# Patient Record
Sex: FEMALE | Race: WHITE | NOT HISPANIC OR LATINO | Employment: OTHER | RURAL
[De-identification: names, ages, dates, MRNs, and addresses within clinical notes are randomized per-mention and may not be internally consistent; named-entity substitution may affect disease eponyms.]

---

## 2017-10-12 DIAGNOSIS — I65.23 CAROTID STENOSIS, ASYMPTOMATIC, BILATERAL: Primary | ICD-10-CM

## 2017-11-08 ENCOUNTER — OFFICE VISIT (OUTPATIENT)
Dept: CARDIAC SURGERY | Facility: CLINIC | Age: 82
End: 2017-11-08

## 2017-11-08 VITALS
DIASTOLIC BLOOD PRESSURE: 87 MMHG | BODY MASS INDEX: 24.46 KG/M2 | WEIGHT: 147 LBS | SYSTOLIC BLOOD PRESSURE: 160 MMHG | HEART RATE: 75 BPM

## 2017-11-08 DIAGNOSIS — I65.21 STENOSIS OF RIGHT CAROTID ARTERY: Primary | ICD-10-CM

## 2017-11-08 PROCEDURE — 99212 OFFICE O/P EST SF 10 MIN: CPT | Performed by: THORACIC SURGERY (CARDIOTHORACIC VASCULAR SURGERY)

## 2017-11-08 NOTE — PROGRESS NOTES
11/08/2017  Patient Information  Latrice Everett                                                                                          [address] [facility]  [place] [address]   1934  'PCP/Referring Physician'  Juanjo Razo MD  [phone]  No ref. provider found    Chief Complaint   Patient presents with   • Follow-up     1 year f/u w/ carotid duplex       History of Present Illness:  Patient returns today for follow-up of her carotid disease.  Over the last 12 months she has done very well.  She denies any neurologic events.  She denies any dizziness or vertigo.  She denies TIA or CVA type symptoms.  She sees Dr. Razo on a regular basis.  She denies any other major health issues.  Patient Active Problem List   Diagnosis   • CAD (coronary artery disease)   • Dyslipidemia   • Carotid artery stenosis   • Carotid stenosis s/p right CEA 8/9/16   • Hypothyroidism on replacement     Past Medical History:   Diagnosis Date   • Arthritis    • Carotid artery stenosis    • Dizzy    • Dyslipidemia    • GERD (gastroesophageal reflux disease)    • [place] (hard of hearing)    • Hypertension    • Hypothyroidism    • Irregular heart beat    • Skin cancer     removed from face and hands    • Wears dentures     upper   • Wears glasses      Past Surgical History:   Procedure Laterality Date   • CAROTID ENDARTERECTOMY Right 8/9/2016    Procedure: RIGHT CAROTID ENDARTERECTOMY;  Surgeon: Darwin Chapin MD;  Location: [facility];  Service:    • CAROTID ENDARTERECTOMY Left 04/21/2006    History of Neurological Surgery   • CAROTID ENDARTERECTOMY Right 08/09/2016   • COLONOSCOPY      x4   • SKIN LESION EXCISION  07/06/2016    pre-cancerous lesion removed from face       Current Outpatient Prescriptions:   •  alendronate (FOSAMAX) 35 MG tablet, Take 70 mg by mouth every 7 days. On saturdays , Disp: , Rfl:   •  amLODIPine (NORVASC) 2.5 MG tablet, Take 2.5 mg by mouth daily., Disp: , Rfl:   •  aspirin  MG  tablet, Take 162 mg by mouth daily., Disp: , Rfl:   •  clopidogrel (PLAVIX) 75 MG tablet, Take 1 tablet by mouth daily., Disp: 30 tablet, Rfl: 2  •  gemfibrozil (LOPID) 600 MG tablet, Take 600 mg by mouth 2 (two) times a day., Disp: , Rfl:   •  levothyroxine (SYNTHROID, LEVOTHROID) 112 MCG tablet, Take 112 mcg by mouth daily., Disp: , Rfl:   •  lovastatin (MEVACOR) 40 MG tablet, Take 40 mg by mouth every night., Disp: , Rfl:   •  nitroglycerin (NITROSTAT) 0.4 MG SL tablet, Place 0.4 mg under the tongue every 5 (five) minutes as needed for chest pain. Take no more than 3 doses in 15 minutes., Disp: , Rfl:   No Known Allergies  Social History     Social History   • Marital status:      Spouse name: N/A   • Number of children: 4   • Years of education: N/A     Occupational History   • [employer] Retired     Social History Main Topics   • Smoking status: Never Smoker   • Smokeless tobacco: Never Used   • Alcohol use No   • Drug use: No   • Sexual activity: Defer     Other Topics Concern   • Not on file     Social History Narrative     Family History   Problem Relation Age of Onset   • Peripheral vascular disease Mother    • Heart failure Father      Review of Systems   Constitution: Negative for chills, fever, malaise/fatigue, night sweats and weight loss.   HENT: Negative for congestion, hearing loss, odynophagia and sore throat.    Eyes: Negative for blurred vision and double vision.   Cardiovascular: Negative for chest pain, dyspnea on exertion, leg swelling, orthopnea and palpitations.   Respiratory: Negative for cough, hemoptysis and shortness of breath.    Endocrine: Negative for cold intolerance, heat intolerance, polydipsia, polyphagia and polyuria.   Hematologic/Lymphatic: Does not bruise/bleed easily.   Skin: Negative for itching and rash.   Musculoskeletal: Negative for back pain, joint pain, joint swelling, muscle cramps, muscle weakness, myalgias and neck pain.   Gastrointestinal: Negative for  abdominal pain, constipation, diarrhea, hematemesis, hematochezia, melena, nausea and vomiting.   Genitourinary: Negative for dysuria, frequency and hematuria.   Neurological: Negative for dizziness, focal weakness, headaches, light-headedness, loss of balance, numbness, paresthesias and seizures.   Psychiatric/Behavioral: Negative for depression and suicidal ideas. The patient is not nervous/anxious.    All other systems reviewed and are negative.    Vitals:    11/08/17 0811   BP: 160/87   BP Location: Right arm   Patient Position: Sitting   Pulse: 75   Weight: 147 lb (66.7 kg)      Physical Exam   Neck: Normal range of motion. Neck supple. No JVD present. No tracheal deviation present. No thyromegaly present.   Cardiovascular: Normal rate and regular rhythm.  Exam reveals no gallop and no friction rub.    No murmur heard.  Pulmonary/Chest: No stridor. No respiratory distress. She has no wheezes. She has no rales. She exhibits no tenderness.   Abdominal: Soft. Bowel sounds are normal. There is no tenderness.   Lymphadenopathy:     She has no cervical adenopathy.       Labs/Imaging:  The patient's carotid duplex looks good with 0 29% bilateral stenosis.      Assessment/Plan:   Her incisions are all healing well.  She has no bruits.  She is following closely with Dr. Razo.  She has had no neurologic symptoms.  Overall I think that Ms. Everett appears to be doing excellent.  We will see her back in one year with carotid duplex.      Patient Active Problem List   Diagnosis   • CAD (coronary artery disease)   • Dyslipidemia   • Carotid artery stenosis   • Carotid stenosis s/p right CEA 8/9/16   • Hypothyroidism on replacement     Signed by: Darwin Chapin M.D.    11/8/2017    CC:  MD Hannah Zapata, , editing for Darwin Chapin M.D.    I, Darwin Chapin MD, have read and agree with the editing done by Hannah Erickson, .

## 2018-12-20 ENCOUNTER — TELEPHONE (OUTPATIENT)
Dept: CARDIAC SURGERY | Facility: CLINIC | Age: 83
End: 2018-12-20

## 2019-02-05 ENCOUNTER — TELEPHONE (OUTPATIENT)
Dept: CARDIAC SURGERY | Facility: CLINIC | Age: 84
End: 2019-02-05

## 2019-02-05 DIAGNOSIS — I65.21 CAROTID STENOSIS, RIGHT: Primary | ICD-10-CM

## 2019-02-05 NOTE — TELEPHONE ENCOUNTER
Pt would like to come back for her f/u with Dr. Chapin at the [facility]-Pt would also like to have her test done at [facility]. Verified pt's address, phone number and insurance.

## 2019-03-13 ENCOUNTER — OFFICE VISIT (OUTPATIENT)
Dept: CARDIAC SURGERY | Facility: CLINIC | Age: 84
End: 2019-03-13

## 2019-03-13 VITALS
SYSTOLIC BLOOD PRESSURE: 157 MMHG | HEIGHT: 66 IN | HEART RATE: 61 BPM | BODY MASS INDEX: 23.63 KG/M2 | OXYGEN SATURATION: 98 % | DIASTOLIC BLOOD PRESSURE: 91 MMHG | WEIGHT: 147 LBS

## 2019-03-13 DIAGNOSIS — I65.23 BILATERAL CAROTID ARTERY STENOSIS: Primary | ICD-10-CM

## 2019-03-13 PROCEDURE — 99212 OFFICE O/P EST SF 10 MIN: CPT | Performed by: THORACIC SURGERY (CARDIOTHORACIC VASCULAR SURGERY)

## 2019-03-13 RX ORDER — ATENOLOL 25 MG/1
1 TABLET ORAL DAILY
COMMUNITY
Start: 2019-02-15

## 2019-03-13 RX ORDER — LOSARTAN POTASSIUM 100 MG/1
1 TABLET ORAL DAILY
COMMUNITY
Start: 2019-03-08 | End: 2020-09-27

## 2019-03-13 RX ORDER — ALENDRONATE SODIUM 70 MG/1
1 TABLET ORAL
COMMUNITY
Start: 2019-01-09

## 2020-04-09 ENCOUNTER — TELEPHONE (OUTPATIENT)
Dept: CARDIAC SURGERY | Facility: CLINIC | Age: 85
End: 2020-04-09

## 2020-05-22 DIAGNOSIS — I65.23 BILATERAL CAROTID ARTERY STENOSIS: Primary | ICD-10-CM

## 2020-08-26 ENCOUNTER — OFFICE VISIT (OUTPATIENT)
Dept: CARDIAC SURGERY | Facility: CLINIC | Age: 85
End: 2020-08-26

## 2020-08-26 VITALS
HEIGHT: 65 IN | HEART RATE: 57 BPM | TEMPERATURE: 97.5 F | OXYGEN SATURATION: 99 % | SYSTOLIC BLOOD PRESSURE: 170 MMHG | DIASTOLIC BLOOD PRESSURE: 90 MMHG | WEIGHT: 144 LBS | BODY MASS INDEX: 23.99 KG/M2

## 2020-08-26 DIAGNOSIS — I65.23 CAROTID STENOSIS, ASYMPTOMATIC, BILATERAL: Primary | ICD-10-CM

## 2020-08-26 PROCEDURE — 99212 OFFICE O/P EST SF 10 MIN: CPT | Performed by: THORACIC SURGERY (CARDIOTHORACIC VASCULAR SURGERY)

## 2020-08-26 RX ORDER — VALSARTAN 160 MG/1
160 TABLET ORAL DAILY
COMMUNITY
Start: 2020-06-15

## 2020-08-26 NOTE — PROGRESS NOTES
08/26/2020  Patient Information  Latrice Everett                                                                                          [address] [facility] [address]   1934  'PCP/Referring Physician'  Juanjo Razo MD (Inactive)  None  No ref. provider found    Chief Complaint   Patient presents with   • Follow-up     1 year follow up with Carotid duplex   • Carotid Artery Disease       History of Present Illness:   The patient returns today for follow-up of carotid artery disease.  Over the last year she has overall been doing well.  She denies any TIA or CVA symptoms.  She is not a smoker.  She is seeing Dr. Tony in follow-up in the next week she says.      Patient Active Problem List   Diagnosis   • CAD (coronary artery disease)   • Dyslipidemia   • Carotid artery stenosis   • Carotid stenosis s/p right CEA 8/9/16   • Hypothyroidism on replacement   • Carotid stenosis, asymptomatic, bilateral     Past Medical History:   Diagnosis Date   • Arthritis    • Atrial fibrillation (CMS/HCC)    • Carotid artery stenosis    • Dizzy    • Dyslipidemia    • GERD (gastroesophageal reflux disease)    • [place] (hard of hearing)    • Hypertension    • Hypothyroidism    • Irregular heart beat    • Skin cancer     removed from face and hands    • Wears dentures     upper   • Wears glasses      Past Surgical History:   Procedure Laterality Date   • CAROTID ENDARTERECTOMY Right 8/9/2016    Procedure: RIGHT CAROTID ENDARTERECTOMY;  Surgeon: Darwin Chapin MD;  Location: [facility];  Service:    • CAROTID ENDARTERECTOMY Left 04/21/2006    History of Neurological Surgery   • CAROTID ENDARTERECTOMY Right 08/09/2016   • COLONOSCOPY      x4   • CORONARY STENT PLACEMENT     • SKIN LESION EXCISION  07/06/2016    pre-cancerous lesion removed from face       Current Outpatient Medications:   •  alendronate (FOSAMAX) 70 MG tablet, Take 1 tablet by mouth Daily., Disp: , Rfl:   •  amLODIPine (NORVASC) 5 MG  tablet, Take 5 mg by mouth Daily., Disp: , Rfl:   •  aspirin 81 MG EC tablet, Take 162 mg by mouth Daily., Disp: , Rfl:   •  atenolol (TENORMIN) 25 MG tablet, Take 1 tablet by mouth 2 (Two) Times a Day., Disp: , Rfl:   •  levothyroxine (SYNTHROID, LEVOTHROID) 125 MCG tablet, Take 125 mcg by mouth Daily., Disp: , Rfl:   •  lovastatin (MEVACOR) 10 MG tablet, Take 40 mg by mouth Every Night., Disp: , Rfl:   •  nitroglycerin (NITROSTAT) 0.4 MG SL tablet, Place 0.4 mg under the tongue every 5 (five) minutes as needed for chest pain. Take no more than 3 doses in 15 minutes., Disp: , Rfl:   •  rivaroxaban (XARELTO) 20 MG tablet, Take 20 mg by mouth Daily., Disp: , Rfl:   •  valsartan (DIOVAN) 160 MG tablet, , Disp: , Rfl:   •  alendronate (FOSAMAX) 35 MG tablet, Take 70 mg by mouth every 7 days. On saturdays , Disp: , Rfl:   •  clopidogrel (PLAVIX) 75 MG tablet, Take 1 tablet by mouth daily., Disp: 30 tablet, Rfl: 2  •  gemfibrozil (LOPID) 600 MG tablet, Take 600 mg by mouth 2 (two) times a day., Disp: , Rfl:   •  losartan (COZAAR) 100 MG tablet, Take 1 tablet by mouth Daily., Disp: , Rfl:   No Known Allergies  Social History     Socioeconomic History   • Marital status:      Spouse name: Not on file   • Number of children: 4   • Years of education: Not on file   • Highest education level: Not on file   Occupational History   • Occupation: [employer]     Employer: RETIRED   Tobacco Use   • Smoking status: Never Smoker   • Smokeless tobacco: Never Used   Substance and Sexual Activity   • Alcohol use: No   • Drug use: No   • Sexual activity: Defer   Social History Narrative    Lives in [place], [place] alone     Family History   Problem Relation Age of Onset   • Peripheral vascular disease Mother    • Heart failure Father      Review of Systems   Constitution: Positive for malaise/fatigue. Negative for chills, diaphoresis, fever and weight loss.   HENT: Positive for hearing loss (hearing aids). Negative for  "congestion, hoarse voice and sore throat.    Eyes: Negative for blurred vision and double vision.   Cardiovascular: Positive for chest pain and dyspnea on exertion. Negative for claudication, leg swelling, palpitations and syncope.   Respiratory: Positive for shortness of breath. Negative for cough, hemoptysis and wheezing.    Hematologic/Lymphatic: Bruises/bleeds easily.   Skin: Negative for itching, poor wound healing and rash.   Musculoskeletal: Positive for joint pain. Negative for arthritis, back pain, falls, joint swelling, muscle cramps, muscle weakness and neck pain.   Gastrointestinal: Negative for abdominal pain, constipation, diarrhea, hematemesis, nausea and vomiting.   Genitourinary: Negative for dysuria, frequency, hematuria, hesitancy, incomplete emptying and urgency.   Neurological: Positive for dizziness and loss of balance. Negative for headaches, light-headedness, numbness and vertigo.   Psychiatric/Behavioral: Positive for depression. Negative for memory loss and substance abuse. The patient is not nervous/anxious.    Allergic/Immunologic: Negative for environmental allergies and HIV exposure.     Vitals:    08/26/20 0747   BP: 170/90   Pulse: 57   Temp: 97.5 °F (36.4 °C)   TempSrc: Infrared   SpO2: 99%   Weight: 65.3 kg (144 lb)   Height: 165.1 cm (65\")      Physical Exam   Neck: Normal range of motion. Neck supple. No JVD present. No tracheal deviation present. No thyromegaly present.   Cardiovascular: Normal rate and regular rhythm. Exam reveals no gallop and no friction rub.   No murmur heard.  Pulses:       Carotid pulses are 0 on the right side, and 0 on the left side.  Pulmonary/Chest: No stridor. No respiratory distress. She has no wheezes. She has no rales. She exhibits no tenderness.   Abdominal: Soft. Bowel sounds are normal. There is no tenderness.   Lymphadenopathy:     She has no cervical adenopathy.       Assessment/Plan:  The patient is an 85-year-old female that returns today for " a follow-up of carotid artery stenosis. She overall appears to be doing satisfactory at this point.  I have obtained and reviewed her carotid duplex.  She has moderate disease on her left her right side looks satisfactory.  I encouraged her to continue close follow-up with Dr. Razo.  I will see her back in 1 year with a carotid duplex.    Patient Active Problem List   Diagnosis   • CAD (coronary artery disease)   • Dyslipidemia   • Carotid artery stenosis   • Carotid stenosis s/p right CEA 8/9/16   • Hypothyroidism on replacement   • Carotid stenosis, asymptomatic, bilateral     CC: MD Laurel Adhikari editing for Darwin Chapin MD    I, Darwin Chapin MD, have read and agree with the editing done by Laurel Abad, .

## 2020-09-02 DIAGNOSIS — Z00.6 EXAMINATION FOR NORMAL COMPARISON FOR CLINICAL RESEARCH: Primary | ICD-10-CM

## 2020-09-09 ENCOUNTER — OFFICE VISIT (OUTPATIENT)
Dept: CARDIAC SURGERY | Facility: CLINIC | Age: 85
End: 2020-09-09

## 2020-09-09 VITALS
OXYGEN SATURATION: 98 % | SYSTOLIC BLOOD PRESSURE: 160 MMHG | HEART RATE: 59 BPM | TEMPERATURE: 97.1 F | WEIGHT: 144 LBS | DIASTOLIC BLOOD PRESSURE: 70 MMHG | BODY MASS INDEX: 23.99 KG/M2 | HEIGHT: 65 IN

## 2020-09-09 DIAGNOSIS — I71.40 ABDOMINAL AORTIC ANEURYSM (AAA) WITHOUT RUPTURE (HCC): Primary | ICD-10-CM

## 2020-09-09 PROCEDURE — 99215 OFFICE O/P EST HI 40 MIN: CPT | Performed by: THORACIC SURGERY (CARDIOTHORACIC VASCULAR SURGERY)

## 2020-09-09 NOTE — PROGRESS NOTES
2020  Patient Information  Latrice Everett                                                                                          [address] [facility] [address]   1934  'PCP/Referring Physician'  Juanjo Razo MD  [phone]  No ref. provider found    Chief Complaint   Patient presents with   • Follow-up     To Discuss AAA SX-Increase in Size       History of Present Illness:  The patient is an 85-year-old white female who is being referred for a new problem.  She has been found to have an abdominal aneurysm.  She had a CT scan which reveals she has a 5 cm aneurysm.  Her son apparently had an aneurysm that was in his brain and almost .  She denies any back pain or abdominal pain at this current time.  She denies a family history of aneurysmal disease and she does not smoke at the current time.      Patient Active Problem List   Diagnosis   • CAD (coronary artery disease)   • Dyslipidemia   • Carotid artery stenosis   • Carotid stenosis s/p right CEA 16   • Hypothyroidism on replacement   • Carotid stenosis, asymptomatic, bilateral   • Abdominal aortic aneurysm (AAA) without rupture (CMS/HCC)     Past Medical History:   Diagnosis Date   • AAA (abdominal aortic aneurysm) (CMS/HCC)    • Arthritis    • Atrial fibrillation (CMS/HCC)    • Carotid artery stenosis    • Dizzy    • Dyslipidemia    • GERD (gastroesophageal reflux disease)    • [place] (hard of hearing)    • Hypertension    • Hypothyroidism    • Irregular heart beat    • Skin cancer     removed from face and hands    • Wears dentures     upper   • Wears glasses      Past Surgical History:   Procedure Laterality Date   • CAROTID ENDARTERECTOMY Right 2016    Procedure: RIGHT CAROTID ENDARTERECTOMY;  Surgeon: Darwin Chapin MD;  Location: [facility];  Service:    • CAROTID ENDARTERECTOMY Left 2006    History of Neurological Surgery   • CAROTID ENDARTERECTOMY Right 2016   • COLONOSCOPY      x4   •  CORONARY STENT PLACEMENT     • SKIN LESION EXCISION  07/06/2016    pre-cancerous lesion removed from face       Current Outpatient Medications:   •  alendronate (FOSAMAX) 70 MG tablet, Take 1 tablet by mouth Daily., Disp: , Rfl:   •  amLODIPine (NORVASC) 5 MG tablet, Take 5 mg by mouth Daily., Disp: , Rfl:   •  aspirin 81 MG EC tablet, Take 162 mg by mouth Daily., Disp: , Rfl:   •  atenolol (TENORMIN) 25 MG tablet, Take 1 tablet by mouth 2 (Two) Times a Day., Disp: , Rfl:   •  gemfibrozil (LOPID) 600 MG tablet, Take 600 mg by mouth 2 (two) times a day., Disp: , Rfl:   •  levothyroxine (SYNTHROID, LEVOTHROID) 125 MCG tablet, Take 125 mcg by mouth Daily., Disp: , Rfl:   •  losartan (COZAAR) 100 MG tablet, Take 1 tablet by mouth Daily., Disp: , Rfl:   •  lovastatin (MEVACOR) 10 MG tablet, Take 40 mg by mouth Every Night., Disp: , Rfl:   •  nitroglycerin (NITROSTAT) 0.4 MG SL tablet, Place 0.4 mg under the tongue every 5 (five) minutes as needed for chest pain. Take no more than 3 doses in 15 minutes., Disp: , Rfl:   •  rivaroxaban (XARELTO) 20 MG tablet, Take 20 mg by mouth Daily., Disp: , Rfl:   •  valsartan (DIOVAN) 160 MG tablet, , Disp: , Rfl:   •  alendronate (FOSAMAX) 35 MG tablet, Take 70 mg by mouth every 7 days. On saturdays , Disp: , Rfl:   •  clopidogrel (PLAVIX) 75 MG tablet, Take 1 tablet by mouth daily., Disp: 30 tablet, Rfl: 2  No Known Allergies  Social History     Socioeconomic History   • Marital status:      Spouse name: Not on file   • Number of children: 4   • Years of education: Not on file   • Highest education level: Not on file   Occupational History   • Occupation: [employer]     Employer: RETIRED   Tobacco Use   • Smoking status: Never Smoker   • Smokeless tobacco: Never Used   Substance and Sexual Activity   • Alcohol use: No   • Drug use: No   • Sexual activity: Defer   Social History Narrative    Lives in [place], [place] alone     Family History   Problem Relation Age of  "Onset   • Peripheral vascular disease Mother    • Heart failure Father      Review of Systems   Constitution: Positive for malaise/fatigue. Negative for chills, fever, night sweats and weight loss.   HENT: Positive for hearing loss. Negative for odynophagia and sore throat.    Cardiovascular: Positive for chest pain and dyspnea on exertion. Negative for leg swelling, orthopnea and palpitations.   Respiratory: Positive for shortness of breath. Negative for cough and hemoptysis.    Endocrine: Negative for cold intolerance, heat intolerance, polydipsia, polyphagia and polyuria.   Hematologic/Lymphatic: Bruises/bleeds easily.   Skin: Negative for itching and rash.   Musculoskeletal: Positive for joint pain. Negative for joint swelling and myalgias.   Gastrointestinal: Negative for abdominal pain, constipation, diarrhea, hematemesis, hematochezia, melena, nausea and vomiting.   Genitourinary: Negative for dysuria, frequency and hematuria.   Neurological: Positive for dizziness and loss of balance. Negative for focal weakness, headaches, numbness and seizures.   Psychiatric/Behavioral: Negative for suicidal ideas.   All other systems reviewed and are negative.    Vitals:    09/09/20 0827   BP: 160/70   BP Location: Left arm   Patient Position: Sitting   Pulse: 59   Temp: 97.1 °F (36.2 °C)   SpO2: 98%   Weight: 65.3 kg (144 lb)   Height: 165.1 cm (65\")      Physical Exam   Constitutional: She is oriented to person, place, and time. She appears well-developed and well-nourished. No distress.   HENT:   Head: Normocephalic.   Eyes: Pupils are equal, round, and reactive to light. EOM are normal.   Neck: Normal range of motion. Carotid bruit is not present. No thyromegaly present.   Cardiovascular: Normal rate and regular rhythm. Exam reveals no gallop and no friction rub.   No murmur heard.  Pulmonary/Chest: She has no wheezes. She has no rales.   Abdominal: Soft. Bowel sounds are normal. She exhibits no distension and no " mass. There is no hepatomegaly. There is no tenderness.   Musculoskeletal: Normal range of motion. She exhibits no deformity.   Neurological: She is alert and oriented to person, place, and time. She has normal strength. No cranial nerve deficit or sensory deficit.   Skin: No bruising and no petechiae noted. No cyanosis. Nails show no clubbing.   Psychiatric: She has a normal mood and affect.     The past medical history, surgical history, family history, social history, review of systems and vitals were reviewed by myself and corrected as needed.      Assessment/Plan:   The patient is an 85-year-old white female who has a complicated medical history with a history of carotid artery disease. She is being referred back for an abdominal aortic aneurysm.  She currently is on Xarelto.  I have obtained and reviewed her CTA in detail.  I concur with a 5 cm infrarenal abdominal aneurysm.  I think that she does have access that we can repair this. I also discussed the case with Ruel Chavira the rep for [employer]. I recommended an endograft repair of this.  We will need to hold her Xarelto prior to the procedure.  I discussed the procedure, risks, and alternatives with both her and her daughter. I have indicated that there are risks which include, risk to her life, bleeding, infection, organ failure, as well as other risks factors.  However, she appears to understand and desires to proceed.  She is 85 but she looks much younger than this.  She has no further questions.    Patient Active Problem List   Diagnosis   • CAD (coronary artery disease)   • Dyslipidemia   • Carotid artery stenosis   • Carotid stenosis s/p right CEA 8/9/16   • Hypothyroidism on replacement   • Carotid stenosis, asymptomatic, bilateral   • Abdominal aortic aneurysm (AAA) without rupture (CMS/HCC)     CC: MD Laurel Adhikari editing for Darwin Chapin MD    I, Darwin Chapin MD, have read and agree with the editing done by Laurel Abad,  .

## 2020-09-10 ENCOUNTER — PREP FOR SURGERY (OUTPATIENT)
Dept: OTHER | Facility: HOSPITAL | Age: 85
End: 2020-09-10

## 2020-09-10 DIAGNOSIS — I71.40 AAA (ABDOMINAL AORTIC ANEURYSM) WITHOUT RUPTURE (HCC): Primary | ICD-10-CM

## 2020-09-11 PROBLEM — I71.40 AAA (ABDOMINAL AORTIC ANEURYSM) WITHOUT RUPTURE (HCC): Status: ACTIVE | Noted: 2020-09-11

## 2020-09-22 ENCOUNTER — PREP FOR SURGERY (OUTPATIENT)
Dept: OTHER | Facility: HOSPITAL | Age: 85
End: 2020-09-22

## 2020-09-22 DIAGNOSIS — I71.40 AAA (ABDOMINAL AORTIC ANEURYSM) WITHOUT RUPTURE (HCC): Primary | ICD-10-CM

## 2020-09-22 RX ORDER — CHLORHEXIDINE GLUCONATE 500 MG/1
1 CLOTH TOPICAL EVERY 12 HOURS PRN
Status: CANCELLED | OUTPATIENT
Start: 2020-09-27

## 2020-09-27 ENCOUNTER — APPOINTMENT (OUTPATIENT)
Dept: PREADMISSION TESTING | Facility: HOSPITAL | Age: 85
End: 2020-09-27

## 2020-09-27 ENCOUNTER — HOSPITAL ENCOUNTER (OUTPATIENT)
Dept: GENERAL RADIOLOGY | Facility: HOSPITAL | Age: 85
Discharge: HOME | End: 2020-09-27

## 2020-09-27 VITALS — HEIGHT: 65 IN | BODY MASS INDEX: 24.72 KG/M2 | WEIGHT: 148.4 LBS

## 2020-09-27 DIAGNOSIS — I71.40 AAA (ABDOMINAL AORTIC ANEURYSM) WITHOUT RUPTURE (HCC): ICD-10-CM

## 2020-09-27 LAB
ABO GROUP BLD: NORMAL
ANION GAP SERPL CALCULATED.3IONS-SCNC: 11 MMOL/L (ref 5–15)
APTT PPP: 31 SECONDS (ref 24–37)
BASOPHILS # BLD AUTO: 0.04 10*3/MM3 (ref 0–0.2)
BASOPHILS NFR BLD AUTO: 0.5 % (ref 0–1.5)
BLD GP AB SCN SERPL QL: NEGATIVE
BUN SERPL-MCNC: 18 MG/DL (ref 8–23)
BUN/CREAT SERPL: 19.6 (ref 7–25)
CALCIUM SPEC-SCNC: 9.9 MG/DL (ref 8.6–10.5)
CHLORIDE SERPL-SCNC: 101 MMOL/L (ref 98–107)
CO2 SERPL-SCNC: 25 MMOL/L (ref 22–29)
CREAT SERPL-MCNC: 0.92 MG/DL (ref 0.57–1)
DEPRECATED RDW RBC AUTO: 44.1 FL (ref 37–54)
EOSINOPHIL # BLD AUTO: 0.25 10*3/MM3 (ref 0–0.4)
EOSINOPHIL NFR BLD AUTO: 3.2 % (ref 0.3–6.2)
ERYTHROCYTE [DISTWIDTH] IN BLOOD BY AUTOMATED COUNT: 12.9 % (ref 12.3–15.4)
GFR SERPL CREATININE-BSD FRML MDRD: 58 ML/MIN/1.73
GLUCOSE SERPL-MCNC: 91 MG/DL (ref 65–99)
HBA1C MFR BLD: 5.7 % (ref 4.8–5.6)
HCT VFR BLD AUTO: 39.2 % (ref 34–46.6)
HGB BLD-MCNC: 12.5 G/DL (ref 12–15.9)
IMM GRANULOCYTES # BLD AUTO: 0.02 10*3/MM3 (ref 0–0.05)
IMM GRANULOCYTES NFR BLD AUTO: 0.3 % (ref 0–0.5)
INR PPP: 1.04 (ref 0.85–1.16)
LYMPHOCYTES # BLD AUTO: 1.87 10*3/MM3 (ref 0.7–3.1)
LYMPHOCYTES NFR BLD AUTO: 23.8 % (ref 19.6–45.3)
MCH RBC QN AUTO: 29.6 PG (ref 26.6–33)
MCHC RBC AUTO-ENTMCNC: 31.9 G/DL (ref 31.5–35.7)
MCV RBC AUTO: 92.9 FL (ref 79–97)
MONOCYTES # BLD AUTO: 0.93 10*3/MM3 (ref 0.1–0.9)
MONOCYTES NFR BLD AUTO: 11.8 % (ref 5–12)
NEUTROPHILS NFR BLD AUTO: 4.75 10*3/MM3 (ref 1.7–7)
NEUTROPHILS NFR BLD AUTO: 60.4 % (ref 42.7–76)
NRBC BLD AUTO-RTO: 0 /100 WBC (ref 0–0.2)
PLATELET # BLD AUTO: 336 10*3/MM3 (ref 140–450)
PMV BLD AUTO: 10.1 FL (ref 6–12)
POTASSIUM SERPL-SCNC: 5 MMOL/L (ref 3.5–5.2)
PROTHROMBIN TIME: 13.3 SECONDS (ref 11.5–14)
RBC # BLD AUTO: 4.22 10*6/MM3 (ref 3.77–5.28)
REF LAB TEST METHOD: NORMAL
RH BLD: POSITIVE
SARS-COV-2 RNA NOSE QL NAA+PROBE: NOT DETECTED
SARS-COV-2 RNA RESP QL NAA+PROBE: NORMAL
SODIUM SERPL-SCNC: 137 MMOL/L (ref 136–145)
T&S EXPIRATION DATE: NORMAL
WBC # BLD AUTO: 7.86 10*3/MM3 (ref 3.4–10.8)

## 2020-09-27 PROCEDURE — 80048 BASIC METABOLIC PNL TOTAL CA: CPT | Performed by: PHYSICIAN ASSISTANT

## 2020-09-27 PROCEDURE — 86901 BLOOD TYPING SEROLOGIC RH(D): CPT | Performed by: PHYSICIAN ASSISTANT

## 2020-09-27 PROCEDURE — 85730 THROMBOPLASTIN TIME PARTIAL: CPT | Performed by: PHYSICIAN ASSISTANT

## 2020-09-27 PROCEDURE — C9803 HOPD COVID-19 SPEC COLLECT: HCPCS

## 2020-09-27 PROCEDURE — 86850 RBC ANTIBODY SCREEN: CPT | Performed by: PHYSICIAN ASSISTANT

## 2020-09-27 PROCEDURE — U0004 COV-19 TEST NON-CDC HGH THRU: HCPCS

## 2020-09-27 PROCEDURE — 93005 ELECTROCARDIOGRAM TRACING: CPT

## 2020-09-27 PROCEDURE — 86923 COMPATIBILITY TEST ELECTRIC: CPT

## 2020-09-27 PROCEDURE — 36415 COLL VENOUS BLD VENIPUNCTURE: CPT

## 2020-09-27 PROCEDURE — 83036 HEMOGLOBIN GLYCOSYLATED A1C: CPT | Performed by: PHYSICIAN ASSISTANT

## 2020-09-27 PROCEDURE — 85610 PROTHROMBIN TIME: CPT | Performed by: PHYSICIAN ASSISTANT

## 2020-09-27 PROCEDURE — 86900 BLOOD TYPING SEROLOGIC ABO: CPT | Performed by: PHYSICIAN ASSISTANT

## 2020-09-27 PROCEDURE — 85025 COMPLETE CBC W/AUTO DIFF WBC: CPT | Performed by: PHYSICIAN ASSISTANT

## 2020-09-27 PROCEDURE — 93010 ELECTROCARDIOGRAM REPORT: CPT | Performed by: INTERNAL MEDICINE

## 2020-09-27 PROCEDURE — 71046 X-RAY EXAM CHEST 2 VIEWS: CPT

## 2020-09-27 RX ORDER — LEVOTHYROXINE SODIUM 0.1 MG/1
100 TABLET ORAL NIGHTLY
COMMUNITY

## 2020-09-27 RX ORDER — ROSUVASTATIN CALCIUM 20 MG/1
20 TABLET, COATED ORAL NIGHTLY
COMMUNITY

## 2020-09-27 RX ORDER — AMLODIPINE BESYLATE 10 MG/1
10 TABLET ORAL DAILY
COMMUNITY

## 2020-09-28 ENCOUNTER — ANESTHESIA EVENT (OUTPATIENT)
Dept: PERIOP | Facility: HOSPITAL | Age: 85
End: 2020-09-28

## 2020-09-28 RX ORDER — SODIUM CHLORIDE 0.9 % (FLUSH) 0.9 %
10 SYRINGE (ML) INJECTION EVERY 12 HOURS SCHEDULED
Status: CANCELLED | OUTPATIENT
Start: 2020-09-28

## 2020-09-28 RX ORDER — FAMOTIDINE 10 MG/ML
20 INJECTION, SOLUTION INTRAVENOUS ONCE
Status: CANCELLED | OUTPATIENT
Start: 2020-09-28 | End: 2020-09-28

## 2020-09-28 RX ORDER — SODIUM CHLORIDE 0.9 % (FLUSH) 0.9 %
10 SYRINGE (ML) INJECTION AS NEEDED
Status: CANCELLED | OUTPATIENT
Start: 2020-09-28

## 2020-09-29 ENCOUNTER — ANESTHESIA (OUTPATIENT)
Dept: PERIOP | Facility: HOSPITAL | Age: 85
End: 2020-09-29

## 2020-09-29 ENCOUNTER — APPOINTMENT (OUTPATIENT)
Dept: GENERAL RADIOLOGY | Facility: HOSPITAL | Age: 85
End: 2020-09-29

## 2020-09-29 ENCOUNTER — HOSPITAL ENCOUNTER (INPATIENT)
Facility: HOSPITAL | Age: 85
LOS: 1 days | Discharge: HOME | End: 2020-09-30
Attending: THORACIC SURGERY (CARDIOTHORACIC VASCULAR SURGERY) | Admitting: THORACIC SURGERY (CARDIOTHORACIC VASCULAR SURGERY)

## 2020-09-29 DIAGNOSIS — I71.40 AAA (ABDOMINAL AORTIC ANEURYSM) WITHOUT RUPTURE (HCC): ICD-10-CM

## 2020-09-29 LAB — GLUCOSE BLDC GLUCOMTR-MCNC: 135 MG/DL (ref 70–130)

## 2020-09-29 PROCEDURE — 25010000002 FENTANYL CITRATE (PF) 100 MCG/2ML SOLUTION: Performed by: NURSE ANESTHETIST, CERTIFIED REGISTERED

## 2020-09-29 PROCEDURE — C1894 INTRO/SHEATH, NON-LASER: HCPCS | Performed by: THORACIC SURGERY (CARDIOTHORACIC VASCULAR SURGERY)

## 2020-09-29 PROCEDURE — 25010000002 ONDANSETRON PER 1 MG: Performed by: NURSE ANESTHETIST, CERTIFIED REGISTERED

## 2020-09-29 PROCEDURE — 25010000002 PROPOFOL 10 MG/ML EMULSION: Performed by: NURSE ANESTHETIST, CERTIFIED REGISTERED

## 2020-09-29 PROCEDURE — 34705 EVAC RPR A-BIILIAC NDGFT: CPT | Performed by: THORACIC SURGERY (CARDIOTHORACIC VASCULAR SURGERY)

## 2020-09-29 PROCEDURE — 34713 PERQ ACCESS & CLSR FEM ART: CPT | Performed by: THORACIC SURGERY (CARDIOTHORACIC VASCULAR SURGERY)

## 2020-09-29 PROCEDURE — 25010000002 NEOSTIGMINE 10 MG/10ML SOLUTION: Performed by: NURSE ANESTHETIST, CERTIFIED REGISTERED

## 2020-09-29 PROCEDURE — C1769 GUIDE WIRE: HCPCS | Performed by: THORACIC SURGERY (CARDIOTHORACIC VASCULAR SURGERY)

## 2020-09-29 PROCEDURE — 25010000002 CEFUROXIME: Performed by: PHYSICIAN ASSISTANT

## 2020-09-29 PROCEDURE — 25010000002 DEXAMETHASONE PER 1 MG: Performed by: NURSE ANESTHETIST, CERTIFIED REGISTERED

## 2020-09-29 PROCEDURE — C1760 CLOSURE DEV, VASC: HCPCS | Performed by: THORACIC SURGERY (CARDIOTHORACIC VASCULAR SURGERY)

## 2020-09-29 PROCEDURE — 82962 GLUCOSE BLOOD TEST: CPT

## 2020-09-29 PROCEDURE — 25010000002 IOPAMIDOL 61 % SOLUTION: Performed by: THORACIC SURGERY (CARDIOTHORACIC VASCULAR SURGERY)

## 2020-09-29 PROCEDURE — 04V03EZ RESTRICTION OF ABDOMINAL AORTA WITH BRANCHED OR FENESTRATED INTRALUMINAL DEVICE, ONE OR TWO ARTERIES, PERCUTANEOUS APPROACH: ICD-10-PCS | Performed by: THORACIC SURGERY (CARDIOTHORACIC VASCULAR SURGERY)

## 2020-09-29 PROCEDURE — 25010000002 HEPARIN (PORCINE) PER 1000 UNITS: Performed by: THORACIC SURGERY (CARDIOTHORACIC VASCULAR SURGERY)

## 2020-09-29 PROCEDURE — 34812 OPN FEM ART EXPOS: CPT | Performed by: THORACIC SURGERY (CARDIOTHORACIC VASCULAR SURGERY)

## 2020-09-29 PROCEDURE — C2628 CATHETER, OCCLUSION: HCPCS | Performed by: THORACIC SURGERY (CARDIOTHORACIC VASCULAR SURGERY)

## 2020-09-29 PROCEDURE — 25010000002 PROTAMINE SULFATE PER 10 MG: Performed by: NURSE ANESTHETIST, CERTIFIED REGISTERED

## 2020-09-29 PROCEDURE — 25010000002 HEPARIN (PORCINE) PER 1000 UNITS: Performed by: NURSE ANESTHETIST, CERTIFIED REGISTERED

## 2020-09-29 PROCEDURE — 99232 SBSQ HOSP IP/OBS MODERATE 35: CPT | Performed by: INTERNAL MEDICINE

## 2020-09-29 DEVICE — GRFT EXCLDR CONTRALAT 12F 20MM 9.5CM: Type: IMPLANTABLE DEVICE | Site: AORTA | Status: FUNCTIONAL

## 2020-09-29 DEVICE — GRFT EXCLDR C3 TRNK I/LAT 16F 26X14.5MM 14CM: Type: IMPLANTABLE DEVICE | Site: AORTA | Status: FUNCTIONAL

## 2020-09-29 RX ORDER — LIDOCAINE HYDROCHLORIDE 10 MG/ML
0.5 INJECTION, SOLUTION EPIDURAL; INFILTRATION; INTRACAUDAL; PERINEURAL ONCE AS NEEDED
Status: COMPLETED | OUTPATIENT
Start: 2020-09-29 | End: 2020-09-29

## 2020-09-29 RX ORDER — NITROGLYCERIN 0.4 MG/1
0.4 TABLET SUBLINGUAL
Status: DISCONTINUED | OUTPATIENT
Start: 2020-09-29 | End: 2020-09-30 | Stop reason: HOSPADM

## 2020-09-29 RX ORDER — CHLORHEXIDINE GLUCONATE 500 MG/1
1 CLOTH TOPICAL EVERY 12 HOURS PRN
Status: DISCONTINUED | OUTPATIENT
Start: 2020-09-29 | End: 2020-09-29 | Stop reason: HOSPADM

## 2020-09-29 RX ORDER — NALOXONE HCL 0.4 MG/ML
0.4 VIAL (ML) INJECTION AS NEEDED
Status: DISCONTINUED | OUTPATIENT
Start: 2020-09-29 | End: 2020-09-29 | Stop reason: HOSPADM

## 2020-09-29 RX ORDER — SODIUM CHLORIDE 450 MG/100ML
100 INJECTION, SOLUTION INTRAVENOUS CONTINUOUS
Status: DISCONTINUED | OUTPATIENT
Start: 2020-09-29 | End: 2020-09-30 | Stop reason: HOSPADM

## 2020-09-29 RX ORDER — HYDROCODONE BITARTRATE AND ACETAMINOPHEN 5; 325 MG/1; MG/1
1 TABLET ORAL EVERY 6 HOURS PRN
Status: DISCONTINUED | OUTPATIENT
Start: 2020-09-29 | End: 2020-09-30 | Stop reason: HOSPADM

## 2020-09-29 RX ORDER — HEPARIN SODIUM 1000 [USP'U]/ML
INJECTION, SOLUTION INTRAVENOUS; SUBCUTANEOUS AS NEEDED
Status: DISCONTINUED | OUTPATIENT
Start: 2020-09-29 | End: 2020-09-29 | Stop reason: SURG

## 2020-09-29 RX ORDER — FAMOTIDINE 20 MG/1
20 TABLET, FILM COATED ORAL ONCE
Status: COMPLETED | OUTPATIENT
Start: 2020-09-29 | End: 2020-09-29

## 2020-09-29 RX ORDER — ONDANSETRON 2 MG/ML
4 INJECTION INTRAMUSCULAR; INTRAVENOUS EVERY 6 HOURS PRN
Status: DISCONTINUED | OUTPATIENT
Start: 2020-09-29 | End: 2020-09-30 | Stop reason: HOSPADM

## 2020-09-29 RX ORDER — NEOSTIGMINE METHYLSULFATE 1 MG/ML
INJECTION, SOLUTION INTRAVENOUS AS NEEDED
Status: DISCONTINUED | OUTPATIENT
Start: 2020-09-29 | End: 2020-09-29 | Stop reason: SURG

## 2020-09-29 RX ORDER — LEVOTHYROXINE SODIUM 0.1 MG/1
100 TABLET ORAL NIGHTLY
Status: DISCONTINUED | OUTPATIENT
Start: 2020-09-29 | End: 2020-09-30 | Stop reason: HOSPADM

## 2020-09-29 RX ORDER — CLOPIDOGREL BISULFATE 75 MG/1
75 TABLET ORAL DAILY
Status: DISCONTINUED | OUTPATIENT
Start: 2020-09-30 | End: 2020-09-29

## 2020-09-29 RX ORDER — GLYCOPYRROLATE 0.2 MG/ML
INJECTION INTRAMUSCULAR; INTRAVENOUS AS NEEDED
Status: DISCONTINUED | OUTPATIENT
Start: 2020-09-29 | End: 2020-09-29 | Stop reason: SURG

## 2020-09-29 RX ORDER — HYDROMORPHONE HYDROCHLORIDE 1 MG/ML
0.5 INJECTION, SOLUTION INTRAMUSCULAR; INTRAVENOUS; SUBCUTANEOUS
Status: DISCONTINUED | OUTPATIENT
Start: 2020-09-29 | End: 2020-09-29 | Stop reason: HOSPADM

## 2020-09-29 RX ORDER — AMLODIPINE BESYLATE 10 MG/1
10 TABLET ORAL DAILY
Status: DISCONTINUED | OUTPATIENT
Start: 2020-09-29 | End: 2020-09-30 | Stop reason: HOSPADM

## 2020-09-29 RX ORDER — ROCURONIUM BROMIDE 10 MG/ML
INJECTION, SOLUTION INTRAVENOUS AS NEEDED
Status: DISCONTINUED | OUTPATIENT
Start: 2020-09-29 | End: 2020-09-29 | Stop reason: SURG

## 2020-09-29 RX ORDER — ACETAMINOPHEN 325 MG/1
650 TABLET ORAL EVERY 4 HOURS PRN
Status: DISCONTINUED | OUTPATIENT
Start: 2020-09-29 | End: 2020-09-30 | Stop reason: HOSPADM

## 2020-09-29 RX ORDER — DEXAMETHASONE SODIUM PHOSPHATE 4 MG/ML
INJECTION, SOLUTION INTRA-ARTICULAR; INTRALESIONAL; INTRAMUSCULAR; INTRAVENOUS; SOFT TISSUE AS NEEDED
Status: DISCONTINUED | OUTPATIENT
Start: 2020-09-29 | End: 2020-09-29 | Stop reason: SURG

## 2020-09-29 RX ORDER — MEPERIDINE HYDROCHLORIDE 25 MG/ML
12.5 INJECTION INTRAMUSCULAR; INTRAVENOUS; SUBCUTANEOUS
Status: DISCONTINUED | OUTPATIENT
Start: 2020-09-29 | End: 2020-09-29 | Stop reason: HOSPADM

## 2020-09-29 RX ORDER — FENTANYL CITRATE 50 UG/ML
50 INJECTION, SOLUTION INTRAMUSCULAR; INTRAVENOUS
Status: DISCONTINUED | OUTPATIENT
Start: 2020-09-29 | End: 2020-09-29 | Stop reason: HOSPADM

## 2020-09-29 RX ORDER — NICARDIPINE HYDROCHLORIDE 2.5 MG/ML
INJECTION INTRAVENOUS AS NEEDED
Status: DISCONTINUED | OUTPATIENT
Start: 2020-09-29 | End: 2020-09-29 | Stop reason: SURG

## 2020-09-29 RX ORDER — SODIUM CHLORIDE 0.9 % (FLUSH) 0.9 %
3-10 SYRINGE (ML) INJECTION AS NEEDED
Status: DISCONTINUED | OUTPATIENT
Start: 2020-09-29 | End: 2020-09-29 | Stop reason: HOSPADM

## 2020-09-29 RX ORDER — SODIUM CHLORIDE 0.9 % (FLUSH) 0.9 %
3 SYRINGE (ML) INJECTION EVERY 12 HOURS SCHEDULED
Status: DISCONTINUED | OUTPATIENT
Start: 2020-09-29 | End: 2020-09-29 | Stop reason: HOSPADM

## 2020-09-29 RX ORDER — IPRATROPIUM BROMIDE AND ALBUTEROL SULFATE 2.5; .5 MG/3ML; MG/3ML
3 SOLUTION RESPIRATORY (INHALATION) ONCE AS NEEDED
Status: DISCONTINUED | OUTPATIENT
Start: 2020-09-29 | End: 2020-09-29 | Stop reason: HOSPADM

## 2020-09-29 RX ORDER — HYDRALAZINE HYDROCHLORIDE 20 MG/ML
5 INJECTION INTRAMUSCULAR; INTRAVENOUS
Status: DISCONTINUED | OUTPATIENT
Start: 2020-09-29 | End: 2020-09-29 | Stop reason: HOSPADM

## 2020-09-29 RX ORDER — ONDANSETRON 2 MG/ML
4 INJECTION INTRAMUSCULAR; INTRAVENOUS ONCE AS NEEDED
Status: DISCONTINUED | OUTPATIENT
Start: 2020-09-29 | End: 2020-09-29 | Stop reason: HOSPADM

## 2020-09-29 RX ORDER — ONDANSETRON 2 MG/ML
INJECTION INTRAMUSCULAR; INTRAVENOUS AS NEEDED
Status: DISCONTINUED | OUTPATIENT
Start: 2020-09-29 | End: 2020-09-29 | Stop reason: SURG

## 2020-09-29 RX ORDER — PROTAMINE SULFATE 10 MG/ML
INJECTION, SOLUTION INTRAVENOUS AS NEEDED
Status: DISCONTINUED | OUTPATIENT
Start: 2020-09-29 | End: 2020-09-29 | Stop reason: SURG

## 2020-09-29 RX ORDER — LABETALOL HYDROCHLORIDE 5 MG/ML
5 INJECTION, SOLUTION INTRAVENOUS
Status: DISCONTINUED | OUTPATIENT
Start: 2020-09-29 | End: 2020-09-29 | Stop reason: HOSPADM

## 2020-09-29 RX ORDER — ROSUVASTATIN CALCIUM 20 MG/1
20 TABLET, COATED ORAL NIGHTLY
Status: DISCONTINUED | OUTPATIENT
Start: 2020-09-29 | End: 2020-09-30 | Stop reason: HOSPADM

## 2020-09-29 RX ORDER — PROPOFOL 10 MG/ML
VIAL (ML) INTRAVENOUS AS NEEDED
Status: DISCONTINUED | OUTPATIENT
Start: 2020-09-29 | End: 2020-09-29 | Stop reason: SURG

## 2020-09-29 RX ORDER — VALSARTAN 160 MG/1
160 TABLET ORAL DAILY
Status: DISCONTINUED | OUTPATIENT
Start: 2020-09-29 | End: 2020-09-30 | Stop reason: HOSPADM

## 2020-09-29 RX ORDER — LIDOCAINE HYDROCHLORIDE 20 MG/ML
JELLY TOPICAL AS NEEDED
Status: DISCONTINUED | OUTPATIENT
Start: 2020-09-29 | End: 2020-09-29 | Stop reason: SURG

## 2020-09-29 RX ORDER — ETOMIDATE 2 MG/ML
INJECTION INTRAVENOUS AS NEEDED
Status: DISCONTINUED | OUTPATIENT
Start: 2020-09-29 | End: 2020-09-29 | Stop reason: SURG

## 2020-09-29 RX ORDER — ONDANSETRON 4 MG/1
4 TABLET, FILM COATED ORAL EVERY 6 HOURS PRN
Status: DISCONTINUED | OUTPATIENT
Start: 2020-09-29 | End: 2020-09-30 | Stop reason: HOSPADM

## 2020-09-29 RX ORDER — ASPIRIN 81 MG/1
81 TABLET ORAL DAILY
Status: DISCONTINUED | OUTPATIENT
Start: 2020-09-30 | End: 2020-09-30 | Stop reason: HOSPADM

## 2020-09-29 RX ORDER — FENTANYL CITRATE 50 UG/ML
INJECTION, SOLUTION INTRAMUSCULAR; INTRAVENOUS AS NEEDED
Status: DISCONTINUED | OUTPATIENT
Start: 2020-09-29 | End: 2020-09-29 | Stop reason: SURG

## 2020-09-29 RX ORDER — HYDROCODONE BITARTRATE AND ACETAMINOPHEN 5; 325 MG/1; MG/1
1 TABLET ORAL ONCE AS NEEDED
Status: DISCONTINUED | OUTPATIENT
Start: 2020-09-29 | End: 2020-09-29 | Stop reason: HOSPADM

## 2020-09-29 RX ORDER — SODIUM CHLORIDE, SODIUM LACTATE, POTASSIUM CHLORIDE, CALCIUM CHLORIDE 600; 310; 30; 20 MG/100ML; MG/100ML; MG/100ML; MG/100ML
9 INJECTION, SOLUTION INTRAVENOUS CONTINUOUS
Status: DISCONTINUED | OUTPATIENT
Start: 2020-09-29 | End: 2020-09-30

## 2020-09-29 RX ORDER — ATENOLOL 25 MG/1
25 TABLET ORAL NIGHTLY
Status: DISCONTINUED | OUTPATIENT
Start: 2020-09-29 | End: 2020-09-30 | Stop reason: HOSPADM

## 2020-09-29 RX ORDER — LIDOCAINE HYDROCHLORIDE 10 MG/ML
INJECTION, SOLUTION EPIDURAL; INFILTRATION; INTRACAUDAL; PERINEURAL AS NEEDED
Status: DISCONTINUED | OUTPATIENT
Start: 2020-09-29 | End: 2020-09-29 | Stop reason: SURG

## 2020-09-29 RX ADMIN — ROSUVASTATIN CALCIUM 20 MG: 20 TABLET, COATED ORAL at 20:58

## 2020-09-29 RX ADMIN — SODIUM CHLORIDE, POTASSIUM CHLORIDE, SODIUM LACTATE AND CALCIUM CHLORIDE 9 ML/HR: 600; 310; 30; 20 INJECTION, SOLUTION INTRAVENOUS at 08:55

## 2020-09-29 RX ADMIN — FENTANYL CITRATE 75 MCG: 50 INJECTION, SOLUTION INTRAMUSCULAR; INTRAVENOUS at 10:00

## 2020-09-29 RX ADMIN — FAMOTIDINE 20 MG: 20 TABLET, FILM COATED ORAL at 08:55

## 2020-09-29 RX ADMIN — SODIUM CHLORIDE 100 ML/HR: 4.5 INJECTION, SOLUTION INTRAVENOUS at 11:25

## 2020-09-29 RX ADMIN — LEVOTHYROXINE SODIUM 100 MCG: 100 TABLET ORAL at 20:58

## 2020-09-29 RX ADMIN — FENTANYL CITRATE 50 MCG: 0.05 INJECTION, SOLUTION INTRAMUSCULAR; INTRAVENOUS at 11:37

## 2020-09-29 RX ADMIN — NICARDIPINE HYDROCHLORIDE 0.3 MG: 25 INJECTION INTRAVENOUS at 10:56

## 2020-09-29 RX ADMIN — ETOMIDATE 14 MG: 2 INJECTION, SOLUTION INTRAVENOUS at 10:00

## 2020-09-29 RX ADMIN — EPHEDRINE SULFATE 5 MG: 50 INJECTION INTRAMUSCULAR; INTRAVENOUS; SUBCUTANEOUS at 10:37

## 2020-09-29 RX ADMIN — DEXAMETHASONE SODIUM PHOSPHATE 4 MG: 4 INJECTION, SOLUTION INTRAMUSCULAR; INTRAVENOUS at 10:11

## 2020-09-29 RX ADMIN — EPHEDRINE SULFATE 5 MG: 50 INJECTION INTRAMUSCULAR; INTRAVENOUS; SUBCUTANEOUS at 10:18

## 2020-09-29 RX ADMIN — NICARDIPINE HYDROCHLORIDE 2.5 MG/HR: 0.1 INJECTION, SOLUTION INTRAVENOUS at 17:08

## 2020-09-29 RX ADMIN — NICARDIPINE HYDROCHLORIDE 0.2 MG: 25 INJECTION INTRAVENOUS at 11:16

## 2020-09-29 RX ADMIN — PROPOFOL 30 MG: 10 INJECTION, EMULSION INTRAVENOUS at 10:00

## 2020-09-29 RX ADMIN — MUPIROCIN 1 APPLICATION: 20 OINTMENT TOPICAL at 08:55

## 2020-09-29 RX ADMIN — HEPARIN SODIUM 10000 UNITS: 1000 INJECTION, SOLUTION INTRAVENOUS; SUBCUTANEOUS at 10:15

## 2020-09-29 RX ADMIN — GLYCOPYRROLATE 0.6 MG: 0.2 INJECTION INTRAMUSCULAR; INTRAVENOUS at 10:54

## 2020-09-29 RX ADMIN — EPHEDRINE SULFATE 10 MG: 50 INJECTION INTRAMUSCULAR; INTRAVENOUS; SUBCUTANEOUS at 10:09

## 2020-09-29 RX ADMIN — ROCURONIUM BROMIDE 40 MG: 10 INJECTION INTRAVENOUS at 10:00

## 2020-09-29 RX ADMIN — LIDOCAINE HYDROCHLORIDE 2 ML: 20 JELLY TOPICAL at 10:01

## 2020-09-29 RX ADMIN — PROTAMINE SULFATE 100 MG: 10 INJECTION, SOLUTION INTRAVENOUS at 10:57

## 2020-09-29 RX ADMIN — NEOSTIGMINE 3 MG: 1 INJECTION INTRAVENOUS at 10:54

## 2020-09-29 RX ADMIN — AMLODIPINE BESYLATE 10 MG: 10 TABLET ORAL at 15:23

## 2020-09-29 RX ADMIN — NICARDIPINE HYDROCHLORIDE 5 MG/HR: 0.1 INJECTION, SOLUTION INTRAVENOUS at 11:15

## 2020-09-29 RX ADMIN — LIDOCAINE HYDROCHLORIDE 50 MG: 10 INJECTION, SOLUTION EPIDURAL; INFILTRATION; INTRACAUDAL; PERINEURAL at 10:00

## 2020-09-29 RX ADMIN — HYDROCODONE BITARTRATE AND ACETAMINOPHEN 1 TABLET: 5; 325 TABLET ORAL at 23:11

## 2020-09-29 RX ADMIN — CEFUROXIME 1.5 G: 1.5 INJECTION, POWDER, FOR SOLUTION INTRAVENOUS at 09:57

## 2020-09-29 RX ADMIN — ONDANSETRON 4 MG: 2 INJECTION INTRAMUSCULAR; INTRAVENOUS at 10:11

## 2020-09-29 RX ADMIN — LIDOCAINE HYDROCHLORIDE 0.2 ML: 10 INJECTION, SOLUTION EPIDURAL; INFILTRATION; INTRACAUDAL; PERINEURAL at 08:55

## 2020-09-29 RX ADMIN — HYDROCODONE BITARTRATE AND ACETAMINOPHEN 1 TABLET: 5; 325 TABLET ORAL at 14:16

## 2020-09-29 NOTE — ANESTHESIA PROCEDURE NOTES
Arterial Line      Patient reassessed immediately prior to procedure    Patient location during procedure: pre-op   Line placed for hemodynamic monitoring.  Performed By   Anesthesiologist: Soraya Buck MD  Preanesthetic Checklist  Completed: patient identified, site marked, surgical consent, pre-op evaluation, timeout performed, IV checked, risks and benefits discussed and monitors and equipment checked  Arterial Line Prep   Sterile Tech: cap, gloves and sterile barriers  Prep: ChloraPrep  Patient monitoring: blood pressure monitoring, continuous pulse oximetry and EKG  Arterial Line Procedure   Laterality:left  Location:  radial artery  Catheter size: 20 G   Guidance: ultrasound guided and palpation technique  Number of attempts: 1  Successful placement: yes  Post Assessment   Dressing Type: line sutured, occlusive dressing applied, secured with tape and wrist guard applied.   Complications no  Circ/Move/Sens Assessment: normal and unchanged.   Patient Tolerance: patient tolerated the procedure well with no apparent complications

## 2020-09-29 NOTE — ANESTHESIA PREPROCEDURE EVALUATION
Anesthesia Evaluation     Patient summary reviewed and Nursing notes reviewed   no history of anesthetic complications:  NPO Solid Status: > 8 hours  NPO Liquid Status: > 8 hours           Airway   Mallampati: II  TM distance: >3 FB  Neck ROM: full  no difficulty expected  Dental - normal exam     Pulmonary - negative pulmonary ROS and normal exam   Cardiovascular - normal exam    ECG reviewed    (+) hypertension, CAD, dysrhythmias Atrial Fib, PVD,  carotid artery disease (s/p Right CEA) carotid bilateral      Neuro/Psych  (+) dizziness/light headedness,     GI/Hepatic/Renal/Endo    (+)  GERD,  thyroid problem hypothyroidism  (-) liver disease, no renal disease, diabetes    Musculoskeletal     Abdominal  - normal exam    Bowel sounds: normal.   Substance History - negative use     OB/GYN negative ob/gyn ROS         Other   arthritis,    history of cancer    ROS/Med Hx Other: [employer] [place]  Seems slightly confused                  Anesthesia Plan    ASA 3     general   ([place])  intravenous induction     Anesthetic plan, all risks, benefits, and alternatives have been provided, discussed and informed consent has been obtained with: patient.    Plan discussed with CRNA.

## 2020-09-29 NOTE — ANESTHESIA PROCEDURE NOTES
Airway  Urgency: elective    Date/Time: 9/29/2020 10:01 AM  Airway not difficult    General Information and Staff    Patient location during procedure: OR  CRNA: Mike Parmar III, CRNA    Indications and Patient Condition  Indications for airway management: airway protection    Preoxygenated: yes  MILS not maintained throughout  Mask difficulty assessment: 0 - not attempted    Final Airway Details  Final airway type: endotracheal airway      Successful airway: ETT  Cuffed: yes   Successful intubation technique: direct laryngoscopy  Blade: Arpita  Blade size: 3  ETT size (mm): 7.0  Cormack-Lehane Classification: grade IIa - partial view of glottis  Placement verified by: chest auscultation and capnometry   Measured from: lips  ETT/EBT  to lips (cm): 21  Number of attempts at approach: 1  Assessment: lips, teeth, and gum same as pre-op and atraumatic intubation    Additional Comments  Negative epigastric sounds, Breath sound equal bilaterally with symmetric chest rise and fall.

## 2020-09-29 NOTE — ANESTHESIA POSTPROCEDURE EVALUATION
Patient: Latrice Everett    Procedure Summary     Date: 09/29/20 Room / Location:  [facility] /  [facility]    Anesthesia Start: 0952 Anesthesia Stop: 1116    Procedure: ABDOMINAL AORTIC ANEURYSM REPAIR WITH ENDOGRAFT (N/A Abdomen) Diagnosis:       AAA (abdominal aortic aneurysm) without rupture (CMS/HCC)      (AAA (abdominal aortic aneurysm) without rupture (CMS/HCC) [I71.4])    Surgeon: Darwin Chapin MD Provider: Soraya Buck MD    Anesthesia Type: general ASA Status: 3          Anesthesia Type: general    Vitals  Vitals Value Taken Time   /83 09/29/20 1110   Temp 97.1 °F (36.2 °C) 09/29/20 1110   Pulse 84 09/29/20 1116   Resp 17 09/29/20 1110   SpO2 98 % 09/29/20 1116   Vitals shown include unvalidated device data.        Post Anesthesia Care and Evaluation    Patient location during evaluation: PACU  Patient participation: complete - patient participated  Level of consciousness: awake and alert  Pain score: 0  Pain management: adequate  Airway patency: patent  Anesthetic complications: No anesthetic complications  PONV Status: none  Cardiovascular status: hemodynamically stable and acceptable  Respiratory status: nonlabored ventilation, acceptable and nasal cannula  Hydration status: acceptable

## 2020-09-30 VITALS
HEIGHT: 65 IN | HEART RATE: 62 BPM | OXYGEN SATURATION: 93 % | WEIGHT: 145 LBS | BODY MASS INDEX: 24.16 KG/M2 | TEMPERATURE: 96.9 F | RESPIRATION RATE: 18 BRPM | SYSTOLIC BLOOD PRESSURE: 134 MMHG | DIASTOLIC BLOOD PRESSURE: 60 MMHG

## 2020-09-30 LAB
ANION GAP SERPL CALCULATED.3IONS-SCNC: 7 MMOL/L (ref 5–15)
BASOPHILS # BLD AUTO: 0.02 10*3/MM3 (ref 0–0.2)
BASOPHILS NFR BLD AUTO: 0.2 % (ref 0–1.5)
BUN SERPL-MCNC: 17 MG/DL (ref 8–23)
BUN/CREAT SERPL: 22.4 (ref 7–25)
CALCIUM SPEC-SCNC: 8.3 MG/DL (ref 8.6–10.5)
CHLORIDE SERPL-SCNC: 102 MMOL/L (ref 98–107)
CO2 SERPL-SCNC: 22 MMOL/L (ref 22–29)
CREAT SERPL-MCNC: 0.76 MG/DL (ref 0.57–1)
DEPRECATED RDW RBC AUTO: 43.1 FL (ref 37–54)
EOSINOPHIL # BLD AUTO: 0.13 10*3/MM3 (ref 0–0.4)
EOSINOPHIL NFR BLD AUTO: 1 % (ref 0.3–6.2)
ERYTHROCYTE [DISTWIDTH] IN BLOOD BY AUTOMATED COUNT: 12.7 % (ref 12.3–15.4)
GFR SERPL CREATININE-BSD FRML MDRD: 72 ML/MIN/1.73
GLUCOSE SERPL-MCNC: 108 MG/DL (ref 65–99)
HCT VFR BLD AUTO: 30 % (ref 34–46.6)
HGB BLD-MCNC: 9.5 G/DL (ref 12–15.9)
IMM GRANULOCYTES # BLD AUTO: 0.07 10*3/MM3 (ref 0–0.05)
IMM GRANULOCYTES NFR BLD AUTO: 0.6 % (ref 0–0.5)
LYMPHOCYTES # BLD AUTO: 1.04 10*3/MM3 (ref 0.7–3.1)
LYMPHOCYTES NFR BLD AUTO: 8.2 % (ref 19.6–45.3)
MCH RBC QN AUTO: 29.4 PG (ref 26.6–33)
MCHC RBC AUTO-ENTMCNC: 31.7 G/DL (ref 31.5–35.7)
MCV RBC AUTO: 92.9 FL (ref 79–97)
MONOCYTES # BLD AUTO: 1.3 10*3/MM3 (ref 0.1–0.9)
MONOCYTES NFR BLD AUTO: 10.2 % (ref 5–12)
NEUTROPHILS NFR BLD AUTO: 10.15 10*3/MM3 (ref 1.7–7)
NEUTROPHILS NFR BLD AUTO: 79.8 % (ref 42.7–76)
NRBC BLD AUTO-RTO: 0 /100 WBC (ref 0–0.2)
PLATELET # BLD AUTO: 255 10*3/MM3 (ref 140–450)
PMV BLD AUTO: 10.4 FL (ref 6–12)
POTASSIUM SERPL-SCNC: 4.1 MMOL/L (ref 3.5–5.2)
RBC # BLD AUTO: 3.23 10*6/MM3 (ref 3.77–5.28)
SODIUM SERPL-SCNC: 131 MMOL/L (ref 136–145)
WBC # BLD AUTO: 12.71 10*3/MM3 (ref 3.4–10.8)

## 2020-09-30 PROCEDURE — 85025 COMPLETE CBC W/AUTO DIFF WBC: CPT | Performed by: PHYSICIAN ASSISTANT

## 2020-09-30 PROCEDURE — 99024 POSTOP FOLLOW-UP VISIT: CPT | Performed by: THORACIC SURGERY (CARDIOTHORACIC VASCULAR SURGERY)

## 2020-09-30 PROCEDURE — 80048 BASIC METABOLIC PNL TOTAL CA: CPT | Performed by: PHYSICIAN ASSISTANT

## 2020-09-30 PROCEDURE — 25010000002 ONDANSETRON PER 1 MG: Performed by: PHYSICIAN ASSISTANT

## 2020-09-30 RX ADMIN — VALSARTAN 160 MG: 160 TABLET ORAL at 08:16

## 2020-09-30 RX ADMIN — ASPIRIN 81 MG: 81 TABLET, COATED ORAL at 08:16

## 2020-09-30 RX ADMIN — AMLODIPINE BESYLATE 10 MG: 10 TABLET ORAL at 08:16

## 2020-09-30 RX ADMIN — ONDANSETRON 4 MG: 2 INJECTION INTRAMUSCULAR; INTRAVENOUS at 08:21

## 2020-10-01 LAB
BH BB BLOOD EXPIRATION DATE: NORMAL
BH BB BLOOD EXPIRATION DATE: NORMAL
BH BB BLOOD TYPE BARCODE: 6200
BH BB BLOOD TYPE BARCODE: 6200
BH BB DISPENSE STATUS: NORMAL
BH BB DISPENSE STATUS: NORMAL
BH BB PRODUCT CODE: NORMAL
BH BB PRODUCT CODE: NORMAL
BH BB UNIT NUMBER: NORMAL
BH BB UNIT NUMBER: NORMAL
CROSSMATCH INTERPRETATION: NORMAL
CROSSMATCH INTERPRETATION: NORMAL
UNIT  ABO: NORMAL
UNIT  ABO: NORMAL
UNIT  RH: NORMAL
UNIT  RH: NORMAL

## 2020-10-01 NOTE — DISCHARGE SUMMARY
CTS Discharge Summary    Patient Care Team:  Juanjo Razo MD as PCP - General (Family Medicine)  Jim Mclean DO as Consulting Physician (Cardiology)  Consults:   Consults     No orders found from 8/31/2020 to 9/30/2020.          Date of Admission: 9/29/2020  6:57 AM  Date of Discharge:  9/30/2020    Discharge Diagnosis  Past Medical History:   Diagnosis Date   • AAA (abdominal aortic aneurysm) (CMS/HCC)    • Arthritis    • Atrial fibrillation (CMS/HCC)    • Carotid artery stenosis    • Coronary artery disease     stent with Dr Vinay gallagher unknown   • Dizzy    • Dyslipidemia    • GERD (gastroesophageal reflux disease)    • [place] (hard of hearing)     hearing aids   • Hypertension    • Hypothyroidism    • Irregular heart beat    • Osteoporosis    • Skin cancer     removed from face and hands    • Wears dentures     upper   • Wears glasses      AAA (abdominal aortic aneurysm) without rupture (CMS/HCC) [I71.4]  AAA (abdominal aortic aneurysm) without rupture (CMS/HCC) [I71.4]     Procedures Performed  Repair of infrarenal aortic abdominal aneurysm with a EVAR [place] excluder device with the main body on the left being a 26 x 14 x 14 C3, contralateral limb on the right being a 20 x 10           Surgeons: Darwin Chapin    History of Present Illness  Patient is a 85 y.o. female female who presents with history of abdominal aortic aneurysm. She denies any back pain or abdominal pain at this current time.  She denies a family history of aneurysmal disease and she does not smoke at the current time.. She presents to the operating room today for surgical management.        Hospital Course  Admitted on 9/29/2020 and underwent the above-mentioned surgical procedure.  Following procedure patient was extubated and awakened in the OR suite and transferred to the recovery room.  Once PACU criteria was met the patient was transferred to the ICU.  While in the ICU the patient was followed by the hospital intensivist service  per hospital protocol.  Monitor closely for any postoperative complications.  IV medications ordered as needed to maintain tight blood pressure parameters.  Patient was followed on daily rounds.  Postoperative day 1 patient found to be without complaint and requested to be discharged home.  After exam and review of medical records patient was in a satisfactory postoperative state and it was felt he could be safely discharged home.  Follow-up appointments will be made.      Discharge Medications     Discharge Medications      Continue These Medications      Instructions Start Date   alendronate 70 MG tablet  Commonly known as: FOSAMAX   1 tablet, Oral, Every 7 Days, Saturdays      amLODIPine 10 MG tablet  Commonly known as: NORVASC   10 mg, Oral, Daily      aspirin 81 MG EC tablet   81 mg, Oral, Daily      atenolol 25 MG tablet  Commonly known as: TENORMIN   1 tablet, Oral, Daily      gemfibrozil 600 MG tablet  Commonly known as: LOPID   600 mg, Oral, 2 Times Daily      levothyroxine 100 MCG tablet  Commonly known as: SYNTHROID, LEVOTHROID   100 mcg, Oral, Nightly      nitroglycerin 0.4 MG SL tablet  Commonly known as: NITROSTAT   0.4 mg, Sublingual, Every 5 Minutes PRN, Take no more than 3 doses in 15 minutes.       rivaroxaban 20 MG tablet  Commonly known as: XARELTO   20 mg, Oral, Daily, Last dose 9-      rosuvastatin 20 MG tablet  Commonly known as: CRESTOR   20 mg, Oral, Nightly      valsartan 160 MG tablet  Commonly known as: DIOVAN   160 mg, Oral, Daily             Discharge Diet:   Diet Instructions     Diet: Regular, Cardiac      Discharge Diet:  Regular  Cardiac             Activity at Discharge:   Activity Instructions     Bathing Restrictions      Type of Restriction: Bathing    Bathing Restrictions: No Tub Bath    Driving Restrictions      Type of Restriction: Driving    Driving Restrictions: No Driving Until Next Appointment    Driving restrictions to be discussed and released and follow-up form     Lifting Restrictions      Type of Restriction: Lifting    Lifting Restrictions: Lifting Restriction (Indicate Limit)    Weight Limit (Pounds): 10    Length of Lifting Restriction: To be discussed in follow-up appointment after exam          Follow-up Appointments  No future appointments.     WOUND CARE: Monitor surgical sites daily.  Keep clean and dry.  Change bandage as needed to keep clean and dry otherwise surgical bandage not needed.  Call Dr. Chapin office with questions or concerns.  Specifically let him know if increased swelling, increased pain, drainage or redness occur.    JOHNATHAN Lewis  10/01/20  15:29 EDT

## 2020-11-16 ENCOUNTER — OFFICE VISIT (OUTPATIENT)
Dept: CARDIAC SURGERY | Facility: CLINIC | Age: 85
End: 2020-11-16

## 2020-11-16 VITALS
HEART RATE: 65 BPM | OXYGEN SATURATION: 99 % | TEMPERATURE: 97.5 F | HEIGHT: 65 IN | WEIGHT: 148.1 LBS | BODY MASS INDEX: 24.68 KG/M2 | DIASTOLIC BLOOD PRESSURE: 78 MMHG | SYSTOLIC BLOOD PRESSURE: 142 MMHG

## 2020-11-16 DIAGNOSIS — I71.40 AAA (ABDOMINAL AORTIC ANEURYSM) WITHOUT RUPTURE (HCC): Primary | ICD-10-CM

## 2020-11-16 PROCEDURE — 99024 POSTOP FOLLOW-UP VISIT: CPT | Performed by: NURSE PRACTITIONER

## 2020-11-16 NOTE — PROGRESS NOTES
.       [facility] Cardiothoracic Surgery Office Follow Up Note     Date of Encounter: 11/16/2020     MRN Number: [id number]  Name: Latrice Everett  Phone Number: [phone]     Referred By: No ref. provider found  PCP: Juanjo Razo MD    Chief Complaint:    Chief Complaint   Patient presents with   • Aortic Aneurysm     Hospital follow up s/p EVAR on 9/29/2020. Patient states that she has been doing well post-op but did have chest pains this morning       History of Present Illness:    Latrice Everett is a 85 y.o. female with a history of hypertension, dyslipidemia, CAD status post stent PAF, bilateral carotid stenosis status post remote left CEA 2006 and right CEA with Dr. Chapin in 2016, and AAA status post endograft repair on 9/29/2020 with Dr. Chapin.  She presents today for postoperative follow-up.  She follows closely with Dr. Mclean.  She did report an episode of her baseline atypical chest pain this a.m., but this is unchanged from preop and last time she saw Dr. Mclean which she has undergone stress testing.  Known left ICA stenosis 5069% on carotid duplex 6/30/2020 with peak 129 cm/s.      Review of Systems:  Review of Systems   Constitution: Negative for chills, decreased appetite, fever and malaise/fatigue.   Cardiovascular: Positive for chest pain (Baseline CP this AM). Negative for claudication, dyspnea on exertion, irregular heartbeat, leg swelling, near-syncope, orthopnea, palpitations and syncope.   Respiratory: Negative for cough, hemoptysis, shortness of breath, sputum production and wheezing.    Hematologic/Lymphatic: Negative for bleeding problem. Does not bruise/bleed easily.   Skin: Negative for color change, poor wound healing and rash.   Musculoskeletal: Negative for back pain, falls and joint pain.   Gastrointestinal: Negative for abdominal pain, constipation, diarrhea, nausea and vomiting.   Neurological: Negative for focal weakness, numbness and paresthesias.    Psychiatric/Behavioral: Negative for depression. The patient does not have insomnia.        I have reviewed the review of systems as entered by my clinical support staff and have updated it as appropriate.       Allergies:  No Known Allergies    Medications:      Current Outpatient Medications:   •  alendronate (FOSAMAX) 70 MG tablet, Take 1 tablet by mouth Every 7 (Seven) Days. Saturdays, Disp: , Rfl:   •  amLODIPine (NORVASC) 10 MG tablet, Take 10 mg by mouth Daily., Disp: , Rfl:   •  aspirin 81 MG EC tablet, Take 81 mg by mouth Daily., Disp: , Rfl:   •  atenolol (TENORMIN) 25 MG tablet, Take 1 tablet by mouth Daily., Disp: , Rfl:   •  gemfibrozil (LOPID) 600 MG tablet, Take 600 mg by mouth 2 (two) times a day., Disp: , Rfl:   •  levothyroxine (SYNTHROID, LEVOTHROID) 100 MCG tablet, Take 100 mcg by mouth Every Night., Disp: , Rfl:   •  nitroglycerin (NITROSTAT) 0.4 MG SL tablet, Place 0.4 mg under the tongue every 5 (five) minutes as needed for chest pain. Take no more than 3 doses in 15 minutes., Disp: , Rfl:   •  rivaroxaban (XARELTO) 20 MG tablet, Take 20 mg by mouth Daily. Last dose 9-, Disp: , Rfl:   •  rosuvastatin (CRESTOR) 20 MG tablet, Take 20 mg by mouth Every Night., Disp: , Rfl:   •  valsartan (DIOVAN) 160 MG tablet, Take 160 mg by mouth Daily., Disp: , Rfl:     Social History     Socioeconomic History   • Marital status:      Spouse name: Not on file   • Number of children: 4   • Years of education: Not on file   • Highest education level: Not on file   Occupational History   • Occupation: [employer]     Employer: RETIRED   Tobacco Use   • Smoking status: Never Smoker   • Smokeless tobacco: Never Used   Substance and Sexual Activity   • Alcohol use: No   • Drug use: No   • Sexual activity: Defer   Social History Narrative    Lives in [place], [place] alone       Family History   Problem Relation Age of Onset   • Peripheral vascular disease Mother    • Heart failure Father   "      Past Medical History:   Diagnosis Date   • AAA (abdominal aortic aneurysm) (CMS/HCC)    • Arthritis    • Atrial fibrillation (CMS/HCC)    • Carotid artery stenosis    • Coronary artery disease     stent with Dr Vinay gallagher unknown   • Dizzy    • Dyslipidemia    • GERD (gastroesophageal reflux disease)    • [place] (hard of hearing)     hearing aids   • Hypertension    • Hypothyroidism    • Irregular heart beat    • Osteoporosis    • Skin cancer     removed from face and hands    • Wears dentures     upper   • Wears glasses        Past Surgical History:   Procedure Laterality Date   • ABDOMINAL AORTIC ANEURYSM REPAIR WITH ENDOGRAFT N/A 9/29/2020    Procedure: ABDOMINAL AORTIC ANEURYSM REPAIR WITH ENDOGRAFT;  Surgeon: Darwin Chapin MD;  Location:  [facility];  Service: Cardiothoracic;  Laterality: N/A;  flouro 11 min 48 sec  dose 442 mGy  contrast 88 ml    • CAROTID ENDARTERECTOMY Right 8/9/2016    Procedure: RIGHT CAROTID ENDARTERECTOMY;  Surgeon: Darwin Chapin MD;  Location:  [facility];  Service:    • CAROTID ENDARTERECTOMY Left 04/21/2006    History of Neurological Surgery   • COLONOSCOPY      x4   • CORONARY STENT PLACEMENT     • SKIN LESION EXCISION  07/06/2016    pre-cancerous lesion removed from face       Physical Exam:  Vital Signs:    Vitals:    11/16/20 1101   BP: 142/78   BP Location: Left arm   Patient Position: Sitting   Pulse: 65   Temp: 97.5 °F (36.4 °C)   SpO2: 99%   Weight: 67.2 kg (148 lb 1.6 oz)   Height: 165.1 cm (65\")     Body mass index is 24.65 kg/m².     Physical Exam  Vitals signs and nursing note reviewed.   Constitutional:       Appearance: Normal appearance. She is well-developed and well-groomed.   HENT:      Head: Normocephalic and atraumatic.   Neck:      Musculoskeletal: Neck supple.   Cardiovascular:      Rate and Rhythm: Normal rate and regular rhythm.      Heart sounds: Normal heart sounds, S1 normal and S2 normal. No murmur. No friction rub.   Pulmonary:      " Comments: Unlabored, Clear to auscultation bilaterally  Abdominal:      General: Bowel sounds are normal.      Palpations: Abdomen is soft.      Tenderness: There is no abdominal tenderness.   Musculoskeletal:      Right lower leg: No edema.      Left lower leg: No edema.   Skin:     General: Skin is warm and dry.      Comments: Incisions:  Bilateral groin puncture sites Intact  No surrounding erythema, hematoma or induration   Neurological:      Mental Status: She is alert and oriented to person, place, and time.   Psychiatric:         Attention and Perception: Attention normal.         Mood and Affect: Mood normal.         Speech: Speech normal.         Behavior: Behavior is cooperative.         Labs/Imaging:    No radiology results for the last 30 days.     Assessment / Plan:  Diagnoses and all orders for this visit:    1. AAA (abdominal aortic aneurysm), status post EVAR on 9/29/2020 (Primary)     Latrice Everett is a 85 y.o. female with a history of hypertension, dyslipidemia, CAD status post stent PAF, bilateral carotid stenosis status post remote left CEA 2006 and right CEA with Dr. Chapin in 2016, and AAA status post endograft repair on 9/29/2020 with Dr. Chapin.  Patient is stable from postoperative standpoint.  She is on ASA/Xaretlo.  Follows regularly with Dr. Mclean with no new cardiac symptoms.  Will have patient to follow-up in 4 months with CTA of the abdomen/pelvis for evaluation of endograft in [place] or earlier as needed.  We will also schedule follow-up carotid duplex in June 2021 if not already done so.    Luz Valentin, APRUVALDO  [facility] Cardiothoracic Surgery    Please note that portions of this note may have been completed with a voice recognition program. Efforts were made to edit the dictations, but occasionally words are mistranscribed.

## 2021-03-04 ENCOUNTER — TELEPHONE (OUTPATIENT)
Dept: CARDIAC SURGERY | Facility: CLINIC | Age: 86
End: 2021-03-04

## 2021-03-04 NOTE — TELEPHONE ENCOUNTER
Pt received a letter in the mail and is ready to schedule her 4 mo f/u and CT abd/pelv with Dr. Chapin. Pt is normally seen in [place]. Verified demo.

## 2021-03-10 DIAGNOSIS — I71.40 AAA (ABDOMINAL AORTIC ANEURYSM) WITHOUT RUPTURE (HCC): Primary | ICD-10-CM

## 2021-05-10 DIAGNOSIS — R42 DIZZINESS: Primary | ICD-10-CM

## 2021-06-23 ENCOUNTER — OFFICE VISIT (OUTPATIENT)
Dept: CARDIAC SURGERY | Facility: CLINIC | Age: 86
End: 2021-06-23

## 2021-06-23 VITALS
HEART RATE: 84 BPM | DIASTOLIC BLOOD PRESSURE: 78 MMHG | OXYGEN SATURATION: 99 % | HEIGHT: 66 IN | WEIGHT: 144 LBS | TEMPERATURE: 97.8 F | BODY MASS INDEX: 23.14 KG/M2 | SYSTOLIC BLOOD PRESSURE: 132 MMHG

## 2021-06-23 DIAGNOSIS — I65.23 CAROTID STENOSIS, ASYMPTOMATIC, BILATERAL: ICD-10-CM

## 2021-06-23 DIAGNOSIS — I71.40 ABDOMINAL AORTIC ANEURYSM (AAA) WITHOUT RUPTURE (HCC): Primary | ICD-10-CM

## 2021-06-23 PROCEDURE — 99212 OFFICE O/P EST SF 10 MIN: CPT | Performed by: THORACIC SURGERY (CARDIOTHORACIC VASCULAR SURGERY)

## 2021-06-23 NOTE — PROGRESS NOTES
06/23/2021  Patient Information  Latrice Everett                                                                                          [address] [facility] [address]   1934  'PCP/Referring Physician'  Juanjo Razo MD  [phone]  No ref. provider found    Chief Complaint   Patient presents with   • Carotid Artery Disease     6 month follow up with carotid duplex        History of Present Illness:   The patient is an 86-year-old female who returns today for follow-up after a carotid duplex and CT scan for carotid artery disease and AAA.  Since last visit she has been, overall, doing reasonably well. She did fall and  break her shoulder, but denies any TIA or abdominal problems.       Patient Active Problem List   Diagnosis   • CAD (coronary artery disease)   • Dyslipidemia   • Carotid artery stenosis   • Hypothyroidism on replacement   • Carotid stenosis, asymptomatic, bilateral   • Abdominal aortic aneurysm (AAA) without rupture (CMS/HCC)   • GERD (gastroesophageal reflux disease)   • Hypertension   • Atrial fibrillation (CMS/HCC)     Past Medical History:   Diagnosis Date   • AAA (abdominal aortic aneurysm) (CMS/HCC)    • Arthritis    • Atrial fibrillation (CMS/HCC)    • Carotid artery stenosis    • Coronary artery disease     stent with Dr Vinay gallagher unknown   • Dizzy    • Dyslipidemia    • GERD (gastroesophageal reflux disease)    • [place] (hard of hearing)     hearing aids   • Hypertension    • Hypothyroidism    • Irregular heart beat    • Osteoporosis    • Skin cancer     removed from face and hands    • Wears dentures     upper   • Wears glasses      Past Surgical History:   Procedure Laterality Date   • ABDOMINAL AORTIC ANEURYSM REPAIR WITH ENDOGRAFT N/A 9/29/2020    Procedure: ABDOMINAL AORTIC ANEURYSM REPAIR WITH ENDOGRAFT;  Surgeon: Darwin Chapin MD;  Location: [place] [address];  Service: Cardiothoracic;  Laterality: N/A;  flouro 11 min 48 sec  dose 442  mGy  contrast 88 ml    • CAROTID ENDARTERECTOMY Right 8/9/2016    Procedure: RIGHT CAROTID ENDARTERECTOMY;  Surgeon: Darwin Chapin MD;  Location: [facility];  Service:    • CAROTID ENDARTERECTOMY Left 04/21/2006    History of Neurological Surgery   • COLONOSCOPY      x4   • CORONARY STENT PLACEMENT     • SKIN LESION EXCISION  07/06/2016    pre-cancerous lesion removed from face       Current Outpatient Medications:   •  alendronate (FOSAMAX) 70 MG tablet, Take 1 tablet by mouth Every 7 (Seven) Days. Saturdays, Disp: , Rfl:   •  amLODIPine (NORVASC) 10 MG tablet, Take 10 mg by mouth Daily., Disp: , Rfl:   •  aspirin 81 MG EC tablet, Take 81 mg by mouth Daily., Disp: , Rfl:   •  atenolol (TENORMIN) 25 MG tablet, Take 1 tablet by mouth Daily., Disp: , Rfl:   •  gemfibrozil (LOPID) 600 MG tablet, Take 600 mg by mouth 2 (two) times a day., Disp: , Rfl:   •  levothyroxine (SYNTHROID, LEVOTHROID) 100 MCG tablet, Take 100 mcg by mouth Every Night., Disp: , Rfl:   •  nitroglycerin (NITROSTAT) 0.4 MG SL tablet, Place 0.4 mg under the tongue every 5 (five) minutes as needed for chest pain. Take no more than 3 doses in 15 minutes., Disp: , Rfl:   •  rivaroxaban (XARELTO) 20 MG tablet, Take 20 mg by mouth Daily. Last dose 9-, Disp: , Rfl:   •  rosuvastatin (CRESTOR) 20 MG tablet, Take 20 mg by mouth Every Night., Disp: , Rfl:   •  valsartan (DIOVAN) 160 MG tablet, Take 160 mg by mouth Daily., Disp: , Rfl:   No Known Allergies  Social History     Socioeconomic History   • Marital status:      Spouse name: Not on file   • Number of children: 4   • Years of education: Not on file   • Highest education level: Not on file   Tobacco Use   • Smoking status: Never Smoker   • Smokeless tobacco: Never Used   Substance and Sexual Activity   • Alcohol use: No   • Drug use: No   • Sexual activity: Defer     Family History   Problem Relation Age of Onset   • Peripheral vascular disease Mother    • Heart failure Father   "    Review of Systems   Constitutional: Negative for chills, decreased appetite, diaphoresis, fever, malaise/fatigue and weight loss.   HENT: Negative for congestion, hearing loss, hoarse voice and sore throat.    Eyes: Negative for blurred vision, double vision and pain.   Cardiovascular: Positive for chest pain (occasionally ) and dyspnea on exertion. Negative for claudication, leg swelling, palpitations and syncope.   Respiratory: Negative for cough, hemoptysis, shortness of breath and wheezing.    Hematologic/Lymphatic: Does not bruise/bleed easily.   Skin: Negative for itching, poor wound healing and rash.   Musculoskeletal: Negative for arthritis, back pain, joint pain, joint swelling, muscle cramps, muscle weakness, myalgias and neck pain.   Gastrointestinal: Negative for abdominal pain, constipation, diarrhea, dysphagia, heartburn, hematemesis, nausea and vomiting.   Genitourinary: Negative for dysuria, flank pain, frequency, hematuria, hesitancy, incomplete emptying, nocturia and urgency.   Neurological: Positive for dizziness and light-headedness. Negative for headaches, loss of balance, numbness, paresthesias and vertigo.   Psychiatric/Behavioral: Negative for depression and suicidal ideas. The patient is not nervous/anxious.      Vitals:    06/23/21 0802   BP: 132/78   BP Location: Right arm   Patient Position: Sitting   Pulse: 84   Temp: 97.8 °F (36.6 °C)   SpO2: 99%   Weight: 65.3 kg (144 lb)   Height: 167.6 cm (66\")      Physical Exam  Vitals and nursing note reviewed.   Cardiovascular:      Rate and Rhythm: Normal rate and regular rhythm.      Pulses: Normal pulses.      Heart sounds: Normal heart sounds.   Pulmonary:      Effort: Pulmonary effort is normal.      Breath sounds: Normal breath sounds.   Abdominal:      General: Abdomen is flat. Bowel sounds are normal.      Palpations: Abdomen is soft.   Musculoskeletal:      Cervical back: Normal range of motion and neck supple.         The ROS, past " medical history, surgical history, family history, social history and vitals were reviewed by myself and corrected as needed.      Assessment/Plan:  The patient is an 86-year-old female who returns today for follow-up after a carotid duplex and CT scan for carotid artery disease and AAA.  Her carotid duplex looks excellent.  Her CT scan of her abdomen also looks good with no evidence of any endoleak.  We will see her back in 1 year with an ultrasound of her abdominal aorta and carotid duplex.    Patient Active Problem List   Diagnosis   • CAD (coronary artery disease)   • Dyslipidemia   • Carotid artery stenosis   • Hypothyroidism on replacement   • Carotid stenosis, asymptomatic, bilateral   • Abdominal aortic aneurysm (AAA) without rupture (CMS/HCC)   • GERD (gastroesophageal reflux disease)   • Hypertension   • Atrial fibrillation (CMS/HCC)       CC: MD Laurel Adhikari editing for Darwin Chapin MD      I, Darwin Chapin MD, have read and agree with the editing done by Laurel Abad, .

## 2022-02-25 DIAGNOSIS — R42 DIZZINESS: Primary | ICD-10-CM

## 2022-02-25 DIAGNOSIS — I71.40 ABDOMINAL AORTIC ANEURYSM (AAA) WITHOUT RUPTURE: ICD-10-CM

## 2022-04-05 ENCOUNTER — TELEPHONE (OUTPATIENT)
Dept: CARDIAC SURGERY | Facility: CLINIC | Age: 87
End: 2022-04-05

## 2022-04-05 NOTE — TELEPHONE ENCOUNTER
Appointment has been cancelled for [facility]. Patient states she is unable to come to [place] at this time. Can we do a telephone visit?   Tawanda

## 2022-05-12 ENCOUNTER — OFFICE VISIT (OUTPATIENT)
Dept: CARDIAC SURGERY | Facility: CLINIC | Age: 87
End: 2022-05-12

## 2022-05-12 DIAGNOSIS — Z98.890 STATUS POST ENDOVASCULAR ANEURYSM REPAIR (EVAR): ICD-10-CM

## 2022-05-12 DIAGNOSIS — I71.40 ABDOMINAL AORTIC ANEURYSM (AAA) WITHOUT RUPTURE: ICD-10-CM

## 2022-05-12 DIAGNOSIS — I65.23 BILATERAL CAROTID ARTERY STENOSIS: Primary | ICD-10-CM

## 2022-05-12 DIAGNOSIS — Z86.79 STATUS POST ENDOVASCULAR ANEURYSM REPAIR (EVAR): ICD-10-CM

## 2022-05-12 PROCEDURE — 99442 PR PHYS/QHP TELEPHONE EVALUATION 11-20 MIN: CPT | Performed by: NURSE PRACTITIONER

## 2022-05-12 RX ORDER — DILTIAZEM HYDROCHLORIDE 120 MG/1
CAPSULE, COATED, EXTENDED RELEASE ORAL
COMMUNITY
Start: 2022-03-16

## 2022-05-12 NOTE — PROGRESS NOTES
You have chosen to receive care through a telephone visit. Do you consent to use a telephone visit for your medical care today? Yes     Telehealth E-Visit      Patient Name: Latrice Everett  : 1934   MRN: [id number]     The patient has consented to a Telehealth Visit.     Chief Complaint:    Chief Complaint   Patient presents with   • Follow-up     1 yr fu with AAA US and Carotid Duplex        History of Present Illness:   Latrice Everett is a 87 y.o. female non-smoker, with a history of HTN, HLD, CAD s/p stent, PAF, bilateral carotid stenosis s/p remote left CEA  and right CEA with Dr. Chapin in , and AAA s/p endograft repair on 2020 by Dr. Chapin. Patient was last seen in clinic by Dr. Chapin 21, doing well with stable carotid duplex and AAA ultrasound.  Patient presents today for 1 year follow-up for surveillance of her carotid disease and AAA s/p EVAR.  Patient reports she is currently recovering from fracture in her back, dissipating in HH PT/OT.  She denies any acute chest pain, back pain, flank pain, or abdominal pain.  She reports her blood pressure is well controlled at home. Pt denies hx of TIA or CVA since last visit, has had no new focal neurologic dysfunction, specifically vision changes or amaurosis fugax.       Subjective      Review of Systems:   Review of Systems   Constitutional: Negative for chills, decreased appetite, diaphoresis, fever, malaise/fatigue, night sweats, weight gain and weight loss.   HENT: Negative for hoarse voice.    Eyes: Negative for blurred vision, double vision and visual disturbance.   Cardiovascular: Negative for chest pain, claudication, dyspnea on exertion, irregular heartbeat, leg swelling, near-syncope, orthopnea, palpitations, paroxysmal nocturnal dyspnea and syncope.   Respiratory: Negative for cough, hemoptysis, shortness of breath, sputum production and wheezing.    Hematologic/Lymphatic: Negative for adenopathy and bleeding problem. Does  not bruise/bleed easily.   Skin: Negative for color change, nail changes, poor wound healing and rash.   Musculoskeletal: Negative for back pain, falls and muscle cramps.   Gastrointestinal: Negative for abdominal pain, dysphagia and heartburn.   Genitourinary: Negative for flank pain.   Neurological: Negative for brief paralysis, disturbances in coordination, dizziness, focal weakness, headaches, light-headedness, loss of balance, numbness, paresthesias, sensory change, vertigo and weakness.   Psychiatric/Behavioral: Negative for depression and suicidal ideas.   Allergic/Immunologic: Negative for persistent infections.       I have reviewed the following portions of the patient's history: allergies, current medications, past family history, past medical history, past social history, past surgical history, problem list and ROS and confirm it's accurate.    Medications:     Current Outpatient Medications:   •  alendronate (FOSAMAX) 70 MG tablet, Take 1 tablet by mouth Every 7 (Seven) Days. Saturdays, Disp: , Rfl:   •  amLODIPine (NORVASC) 10 MG tablet, Take 10 mg by mouth Daily., Disp: , Rfl:   •  aspirin 81 MG EC tablet, Take 81 mg by mouth Daily., Disp: , Rfl:   •  atenolol (TENORMIN) 25 MG tablet, Take 1 tablet by mouth Daily., Disp: , Rfl:   •  dilTIAZem CD (CARDIZEM CD) 120 MG 24 hr capsule, , Disp: , Rfl:   •  gemfibrozil (LOPID) 600 MG tablet, Take 600 mg by mouth 2 (two) times a day., Disp: , Rfl:   •  levothyroxine (SYNTHROID, LEVOTHROID) 100 MCG tablet, Take 100 mcg by mouth Every Night., Disp: , Rfl:   •  nitroglycerin (NITROSTAT) 0.4 MG SL tablet, Place 0.4 mg under the tongue Every 5 (Five) Minutes As Needed for Chest Pain. Take no more than 3 doses in 15 minutes., Disp: , Rfl:   •  rivaroxaban (XARELTO) 20 MG tablet, Take 15 mg by mouth Daily. Last dose 9-, Disp: , Rfl:   •  rosuvastatin (CRESTOR) 20 MG tablet, Take 20 mg by mouth Every Night., Disp: , Rfl:   •  valsartan (DIOVAN) 160 MG tablet,  Take 160 mg by mouth Daily., Disp: , Rfl:     Allergies:   Allergies   Allergen Reactions   • Morphine Nausea And Vomiting       Objective     Physical Exam: JARRED via telehealth   Vital Signs: There were no vitals filed for this visit.  There is no height or weight on file to calculate BMI.    Physical Exam  Constitutional:       General: She is awake.   Neurological:      Mental Status: She is alert.   Psychiatric:         Behavior: Behavior is cooperative.         Imaging/Labs:  Carotid duplex 3/15/22:   Right carotid system:  ICA: 145 cm/s  Left carotid system:  ICA: 97 cm/s  Bilateral vertebral artery flow antegrade.  Impression: 50 to 69% right ICA stenosis, 0 to 29% left ICA stenosis.    Ultrasound of the abdominal aorta 3/15/2022:  Findings: The aorta measures up to 25 mm.  The bifurcation is normal.  Graft repair noted.  Impression: No evidence of abdominal aneurysm s/p graft.    Assessment / Plan      Assessment/Plan:  Diagnoses and all orders for this visit:    1. Bilateral carotid artery stenosis (Primary)    2. Abdominal aortic aneurysm (AAA) without rupture (HCC)    3. Status post endovascular aneurysm repair (EVAR)       1. Bilateral carotid stenosis s/p remote left CEA 2006 and right CEA with Dr. Chapin in 2016: Patient denies any interval TIA/CVA symptoms or vision changes since last visit.  She reports her blood pressure is well controlled at home.  Reviewed recent carotid duplex from March 2022 revealing stable 50 to 69% right ICA stenosis and 0 to 29% left ICA stenosis.  We will plan to see the patient back in 1 year with repeat carotid duplex for continued surveillance.  2. AAA s/p endograft repair on 9/29/2020 by Dr. Chapin: Patient denies any acute abdominal pain, flank pain, or back pain.  She reports her blood pressure is well controlled at home.  Reviewed AAA US from March 2022 revealing stable aorta measuring up to 25 mm with graft repair noted.  We will plan to continue surveillance with  repeat AAA ultrasound in 1 year.  Instructed patient to contact her office for any acute abdominal pain, back pain, or flank pain.    Follow Up:   Return in about 1 year (around 5/12/2023) for F/u with imaging.  Or sooner for any further concerns or worsening sign and symptoms. If unable to reach us in the office please dial 911 or go to the nearest emergency department.    This visit has been rescheduled as a phone visit to comply with patient safety concerns in accordance with CDC recommendations. Total time of discussion was 15 minutes.     CAMI Clay  [facility] Cardiothoracic Surgery

## 2023-04-05 DIAGNOSIS — I71.40 ABDOMINAL AORTIC ANEURYSM (AAA) WITHOUT RUPTURE, UNSPECIFIED PART: Primary | ICD-10-CM

## 2023-04-05 DIAGNOSIS — R42 DIZZINESS: Primary | ICD-10-CM

## 2023-05-10 DIAGNOSIS — R42 DIZZINESS: ICD-10-CM

## 2023-05-16 ENCOUNTER — TELEPHONE (OUTPATIENT)
Dept: CARDIAC SURGERY | Facility: CLINIC | Age: 88
End: 2023-05-16
Payer: MEDICARE

## 2023-05-16 NOTE — TELEPHONE ENCOUNTER
Pt has difficulty with travel and called wanting to see if her appt could be telehealth as it was last year.  She has had carotid and aorta ultrasounds and is doing ok.  She has a lot of back pain jarad with riding in vehicle.

## 2023-05-26 ENCOUNTER — OFFICE VISIT (OUTPATIENT)
Dept: CARDIAC SURGERY | Facility: CLINIC | Age: 88
End: 2023-05-26
Payer: MEDICARE

## 2023-05-26 DIAGNOSIS — Z86.79 STATUS POST ENDOVASCULAR ANEURYSM REPAIR (EVAR): Primary | ICD-10-CM

## 2023-05-26 DIAGNOSIS — Z98.890 STATUS POST ENDOVASCULAR ANEURYSM REPAIR (EVAR): Primary | ICD-10-CM

## 2023-05-26 DIAGNOSIS — I65.23 CAROTID STENOSIS, ASYMPTOMATIC, BILATERAL: ICD-10-CM

## 2023-05-26 NOTE — PROGRESS NOTES
You have chosen to receive care through a telephone visit. Do you consent to use a telephone visit for your medical care today? Yes         [facility] Cardiothoracic Surgery Office Follow Up Note    Date of Encounter: 2023     Name: Latrice Everett  : 1934     Referred By: No ref. provider found  PCP: Luz Saul APRN    Chief Complaint:    Chief Complaint   Patient presents with   • Carotid Artery Disease     1 year follow-up with results from carotid duplex and u/s aorta        Subjective      History of Present Illness:    It was nice to see Latrice Everett in follow up.  She is a pleasant 88 y.o. female with PMH significant for hypertension, hyperlipidemia, coronary artery disease s/p stent, paroxysmal atrial fibrillation, bilateral carotid stenosis s/p remote left CEA  and right CEA with Dr. Chapin in , and AAA s/p endograft repair on 2020 by Dr. Chapin.      Ms. Everett was last evaluated on 2022 by CAMI Slaughter at which time she was overall doing well other than recovering from a fracture in her back.  Patient presents via tele-health today.  She denies any new health concerns/changes over the past year.  Patient denies symptoms of TIA or CVA since last visit.  She denies acute neurologic dysfunction or vision changes, specifically amaurosis fugax.  Patient denies any unusual abdomen, back, or flank pain.  She is not sure what her blood pressure runs as she does not routinely check at home.  Patient states she is in the process of establishing care with a new PCP.     Review of Systems:  Review of Systems   Constitutional: Negative for chills, decreased appetite, diaphoresis, fever, malaise/fatigue, night sweats, weight gain and weight loss.   HENT: Negative for hoarse voice.    Eyes: Negative for blurred vision, double vision and visual disturbance.   Cardiovascular: Positive for chest pain and dyspnea on exertion. Negative for claudication, irregular  heartbeat, leg swelling, near-syncope, orthopnea, palpitations, paroxysmal nocturnal dyspnea and syncope.   Respiratory: Positive for shortness of breath. Negative for cough, hemoptysis, sputum production and wheezing.    Hematologic/Lymphatic: Negative for adenopathy and bleeding problem. Does not bruise/bleed easily.   Skin: Negative for color change, nail changes, poor wound healing and rash.   Musculoskeletal: Positive for joint pain. Negative for back pain, falls and muscle cramps.   Gastrointestinal: Negative for abdominal pain, dysphagia and heartburn.   Genitourinary: Negative for flank pain.   Neurological: Positive for dizziness and light-headedness. Negative for brief paralysis, disturbances in coordination, focal weakness, headaches, loss of balance, numbness, paresthesias, sensory change, vertigo and weakness.   Psychiatric/Behavioral: Negative for depression and suicidal ideas. The patient is not nervous/anxious.    Allergic/Immunologic: Negative for persistent infections.       I have reviewed the following portions of the patient's history: allergies, current medications, past family history, past medical history, past social history, past surgical history and problem list and confirm it's accurate.    Allergies:  Allergies   Allergen Reactions   • Morphine Nausea And Vomiting       Medications:      Current Outpatient Medications:   •  alendronate (FOSAMAX) 70 MG tablet, Take 1 tablet by mouth Every 7 (Seven) Days. Saturdays, Disp: , Rfl:   •  amLODIPine (NORVASC) 10 MG tablet, Take 5 mg by mouth Daily., Disp: , Rfl:   •  aspirin 81 MG EC tablet, Take 1 tablet by mouth Daily., Disp: , Rfl:   •  dilTIAZem CD (CARDIZEM CD) 120 MG 24 hr capsule, , Disp: , Rfl:   •  gemfibrozil (LOPID) 600 MG tablet, Take 1 tablet by mouth 2 (Two) Times a Day., Disp: , Rfl:   •  levothyroxine (SYNTHROID, LEVOTHROID) 100 MCG tablet, Take 1 tablet by mouth Every Night., Disp: , Rfl:   •  nitroglycerin (NITROSTAT) 0.4 MG  SL tablet, Place 1 tablet under the tongue Every 5 (Five) Minutes As Needed for Chest Pain. Take no more than 3 doses in 15 minutes., Disp: , Rfl:   •  rivaroxaban (XARELTO) 20 MG tablet, Take 15 mg by mouth Daily. Last dose 9-, Disp: , Rfl:   •  rosuvastatin (CRESTOR) 20 MG tablet, Take 1 tablet by mouth Every Night., Disp: , Rfl:   •  valsartan (DIOVAN) 160 MG tablet, Take 1 tablet by mouth Daily., Disp: , Rfl:   •  atenolol (TENORMIN) 25 MG tablet, Take 1 tablet by mouth Daily., Disp: , Rfl:     History:   Past Medical History:   Diagnosis Date   • AAA (abdominal aortic aneurysm)    • Arthritis    • Atrial fibrillation    • Carotid artery stenosis    • Coronary artery disease     stent with Dr Vinay gallagher unknown   • Dizzy    • Dyslipidemia    • GERD (gastroesophageal reflux disease)    • [place] (hard of hearing)     hearing aids   • Hypertension    • Hypothyroidism    • Irregular heart beat    • Osteoporosis    • Skin cancer     removed from face and hands    • Wears dentures     upper   • Wears glasses        Past Surgical History:   Procedure Laterality Date   • ABDOMINAL AORTIC ANEURYSM REPAIR WITH ENDOGRAFT N/A 09/29/2020    Procedure: ABDOMINAL AORTIC ANEURYSM REPAIR WITH ENDOGRAFT;  Surgeon: Darwin Chapin MD;  Location: [facility] OR ;  Service: Cardiothoracic;  Laterality: N/A;  flouro 11 min 48 sec  dose 442 mGy  contrast 88 ml    • CAROTID ENDARTERECTOMY Right 08/09/2016    Procedure: RIGHT CAROTID ENDARTERECTOMY;  Surgeon: Darwin Chapin MD;  Location: [facility];  Service:    • CAROTID ENDARTERECTOMY Left 04/21/2006    History of Neurological Surgery   • CATARACT EXTRACTION Bilateral    • COLONOSCOPY      x4   • CORONARY STENT PLACEMENT     • SKIN LESION EXCISION  07/06/2016    pre-cancerous lesion removed from face       Social History     Socioeconomic History   • Marital status:    • Number of children: 4   Tobacco Use   • Smoking status: Never   • Smokeless tobacco: Never    Vaping Use   • Vaping Use: Never used   Substance and Sexual Activity   • Alcohol use: No   • Drug use: No   • Sexual activity: Defer        Family History   Problem Relation Age of Onset   • Peripheral vascular disease Mother    • Heart failure Father        Objective     Physical Exam:  There were no vitals filed for this visit.   There is no height or weight on file to calculate BMI.    Physical Exam  Unable to obtain    Imaging/Labs:  US CAROTID BLOOD FLOW BILATERAL    Result Date: 5/10/2023  Based on validated velocity criteria: Less than 50 % RIGHT ICA stenosis. Less than 50 % LEFT ICA stenosis. ULTRASOUND OF THE ABDOMINAL AORTA HISTORY:  Abdominal aortic aneurysm . COMPARISON: CT abdomen and pelvis from November 2022 PROCEDURE: Ultrasound images of the abdominal aorta were obtained. FINDINGS: The native aneurysmal sac measures 3.9 cm which is unchanged compared to a CT from November 2022. There is shadowing within the endovascular stent graft. No color flow or duplex imaging was performed. IMPRESSION: 3.9 cm native aneurysmal sac. Images reviewed, interpreted, and dictated by Dr. Felisa Mcgee. Transcribed by YESENIA Mcrae(R).    US screening Abdominal Aortic Aneurysm (AAA)    Result Date: 5/10/2023  Based on validated velocity criteria: Less than 50 % RIGHT ICA stenosis. Less than 50 % LEFT ICA stenosis. ULTRASOUND OF THE ABDOMINAL AORTA HISTORY:  Abdominal aortic aneurysm . COMPARISON: CT abdomen and pelvis from November 2022 PROCEDURE: Ultrasound images of the abdominal aorta were obtained. FINDINGS: The native aneurysmal sac measures 3.9 cm which is unchanged compared to a CT from November 2022. There is shadowing within the endovascular stent graft. No color flow or duplex imaging was performed. IMPRESSION: 3.9 cm native aneurysmal sac. Images reviewed, interpreted, and dictated by Dr. Felisa Mcgee. Transcribed by Joy Finn RYolandaTYolanda(R).  US CAROTID BLOOD FLOW BILATERAL (05/10/2023  09:35)  US screening Abdominal Aortic Aneurysm (AAA) (05/10/2023 09:36)    ..I personally reviewed this report and imaging.    Assessment / Plan      Assessment / Plan:  PMH significant for hypertension, hyperlipidemia, coronary artery disease s/p stent, paroxysmal atrial fibrillation, bilateral carotid stenosis s/p remote left CEA 2006 and right CEA with Dr. Chapin in 2016, and AAA s/p endograft repair on 9/29/2020 by Dr. Chapin.      1.  Bilateral carotid stenosis  · S/p left CEA 2006 and right CEA 2016 by Dr. Chapin.  · Denies symptoms of TIA or CVA since last visit.  · Denies acute neurologic dysfunction or vision changes, specifically amaurosis fugax.   · Carotid ultrasound stable as resulted above under imaging/labs.  Right ICA PSV: 58; Left ICA PSV: 88    2.  Abdominal aortic aneurysm without rupture  · S/p endograft repair by Dr. Chapin on 9/29/2020.  · Last evaluated on 5/12/2022 by CAMI Slaughter.  · Denies any unusual abdomen, back, or flank pain.   · Not sure what blood pressure runs as she does not routinely check at home.  · AA US stable as resulted above under imaging/labs.   · In process of establishing care with a new PCP.     Patient Education:  ..The BE FAST acronym helps you remember the signs and symptoms of a stroke: Balance loss, Eyesight loss, Facial drooping, Arm weakness, Speech difficulty, and Time to call 911    Follow Up:   We will plan for follow up in 1 year with imaging.    Or sooner for any further concerns or worsening sign and symptoms.  Patient encouraged to call the office with any questions or concerns.     Thank you for allowing me to participate in your care.  Best Regards,    CAMI Srinivasan  [facility] Cardiothoracic Surgery  05/26/23  10:47 EDT    I spent approximately 10 minutes evaluating and discussing results and plan of care with MsYolanda Karlos via [employer].

## 2023-08-30 ENCOUNTER — OFFICE VISIT (OUTPATIENT)
Dept: CARDIAC SURGERY | Facility: CLINIC | Age: 88
End: 2023-08-30
Payer: MEDICARE

## 2023-08-30 VITALS
DIASTOLIC BLOOD PRESSURE: 74 MMHG | TEMPERATURE: 97.1 F | BODY MASS INDEX: 25.1 KG/M2 | HEIGHT: 64 IN | SYSTOLIC BLOOD PRESSURE: 140 MMHG | OXYGEN SATURATION: 97 % | HEART RATE: 90 BPM | WEIGHT: 147 LBS

## 2023-08-30 DIAGNOSIS — I65.23 BILATERAL CAROTID ARTERY STENOSIS: Primary | ICD-10-CM

## 2023-08-30 RX ORDER — ISOSORBIDE MONONITRATE 30 MG/1
30 TABLET, EXTENDED RELEASE ORAL DAILY
COMMUNITY

## 2023-08-30 NOTE — PROGRESS NOTES
[facility] Cardiothoracic Surgery Office Follow Up Note    Date of Encounter: 2023     Name: Latrice Everett  : 1934     Referred By: No ref. provider found  PCP: Luz Saul APRN    Chief Complaint:    Chief Complaint   Patient presents with    Carotid Artery Disease     Follow up for carotid artery disease,complains of severe headaches over her left eye.       Subjective      History of Present Illness:    It was nice to see Latrice Everett in follow up.  She is a pleasant 88 y.o. female with PMH significant for hypertension, hyperlipidemia, coronary artery disease s/p stent, paroxysmal atrial fibrillation, bilateral carotid stenosis s/p left CEA  and right CEA with Dr. Chapin in , and AAA s/p endograft repair on 2020 by Dr. Chapin.      Ms. Everett was last evaluated by myself via telehealth on 2023 at which time she denied symptoms of TIA or CVA along with acute neurologic dysfunction or vision changes, specifically amaurosis fugax.  She presents to office today accompanied by her daughter.  Patient has presented to outside emergency department twice recently with complaint of severe headache over her left eye.  Patient states this is unrelieved by tylenol or aleve.  She reports the pain as radiating from above her left eye straight back into her head.  She again denies other symptoms of TIA or CVA or neurologic dysfunction.  Patient did report taking an over the counter migraine medication with caffeine which provided her a small amount of relief.     Review of Systems:  Review of Systems   Constitutional: Positive for malaise/fatigue. Negative for chills, decreased appetite, diaphoresis, fever, night sweats and weight loss.   HENT:  Negative for congestion, hoarse voice, sore throat and stridor.    Cardiovascular:  Positive for dyspnea on exertion. Negative for chest pain, claudication, irregular heartbeat, leg swelling, near-syncope, orthopnea, palpitations,  paroxysmal nocturnal dyspnea and syncope.   Respiratory: Negative.  Negative for cough, hemoptysis, shortness of breath, sleep disturbances due to breathing, snoring, sputum production and wheezing.    Hematologic/Lymphatic: Negative for adenopathy and bleeding problem. Bruises/bleeds easily.   Skin: Negative.  Negative for color change, dry skin, itching, poor wound healing and rash.   Musculoskeletal:  Positive for back pain. Negative for arthritis, falls and muscle weakness.   Gastrointestinal:  Positive for nausea. Negative for abdominal pain, anorexia, constipation, diarrhea, hematochezia, melena and vomiting.   Neurological:  Positive for headaches, loss of balance, tremors and weakness. Negative for difficulty with concentration, disturbances in coordination, dizziness, numbness, seizures and vertigo.   Psychiatric/Behavioral:  Positive for depression. Negative for altered mental status, memory loss and substance abuse. The patient has insomnia and is nervous/anxious.    Allergic/Immunologic: Negative for persistent infections.     I have reviewed the following portions of the patient's history: problem list, current medications, allergies, past surgical history, past medical history, past social history, past family history, and ROS and confirm it's accurate.    Allergies:  Allergies   Allergen Reactions    Morphine Nausea And Vomiting       Medications:      Current Outpatient Medications:     alendronate (FOSAMAX) 70 MG tablet, Take 1 tablet by mouth Every 7 (Seven) Days. Saturdays, Disp: , Rfl:     aspirin 81 MG EC tablet, Take 1 tablet by mouth Daily., Disp: , Rfl:     isosorbide mononitrate (IMDUR) 30 MG 24 hr tablet, Take 1 tablet by mouth Daily., Disp: , Rfl:     levothyroxine (SYNTHROID, LEVOTHROID) 100 MCG tablet, Take 1 tablet by mouth Every Night., Disp: , Rfl:     nitroglycerin (NITROSTAT) 0.4 MG SL tablet, Place 1 tablet under the tongue Every 5 (Five) Minutes As Needed for Chest Pain.  Take no more than 3 doses in 15 minutes., Disp: , Rfl:     rivaroxaban (XARELTO) 20 MG tablet, Take 15 mg by mouth Daily. Last dose 9-, Disp: , Rfl:     rosuvastatin (CRESTOR) 20 MG tablet, Take 1 tablet by mouth Every Night., Disp: , Rfl:     valsartan (DIOVAN) 160 MG tablet, Take 1 tablet by mouth Daily., Disp: , Rfl:     amLODIPine (NORVASC) 10 MG tablet, Take 5 mg by mouth Daily. (Patient not taking: Reported on 8/30/2023), Disp: , Rfl:     dilTIAZem CD (CARDIZEM CD) 120 MG 24 hr capsule, , Disp: , Rfl:     gemfibrozil (LOPID) 600 MG tablet, Take 1 tablet by mouth 2 (Two) Times a Day. (Patient not taking: Reported on 8/30/2023), Disp: , Rfl:     History:   Past Medical History:   Diagnosis Date    AAA (abdominal aortic aneurysm)     Arthritis     Atrial fibrillation     Carotid artery stenosis     Coronary artery disease     stent with Dr Vinay gallagher unknown    Dizzy     Dyslipidemia     GERD (gastroesophageal reflux disease)     [place] (hard of hearing)     hearing aids    Hypertension     Hypothyroidism     Irregular heart beat     Osteoporosis     Skin cancer     removed from face and hands     Wears dentures     upper    Wears glasses        Past Surgical History:   Procedure Laterality Date    ABDOMINAL AORTIC ANEURYSM REPAIR WITH ENDOGRAFT N/A 09/29/2020    Procedure: ABDOMINAL AORTIC ANEURYSM REPAIR WITH ENDOGRAFT;  Surgeon: Darwin Chapin MD;  Location: [facility] OR 15;  Service: Cardiothoracic;  Laterality: N/A;  flouro 11 min 48 sec  dose 442 mGy  contrast 88 ml     CAROTID ENDARTERECTOMY Right 08/09/2016    Procedure: RIGHT CAROTID ENDARTERECTOMY;  Surgeon: Darwin Chapin MD;  [facility];  Service:     CAROTID ENDARTERECTOMY Left 04/21/2006    History of Neurological Surgery    CATARACT EXTRACTION Bilateral     COLONOSCOPY      x4    CORONARY STENT PLACEMENT      SKIN LESION EXCISION  07/06/2016    pre-cancerous lesion removed from face       Social  "History     Socioeconomic History    Marital status:     Number of children: 4   Tobacco Use    Smoking status: Never    Smokeless tobacco: Never   Vaping Use    Vaping Use: Never used   Substance and Sexual Activity    Alcohol use: No    Drug use: No    Sexual activity: Defer        Family History   Problem Relation Age of Onset    Peripheral vascular disease Mother     Heart failure Father        Objective     Physical Exam:  Vitals:    08/30/23 1004 08/30/23 1005   BP: 141/74 140/74   BP Location: Right arm Left arm   Patient Position: Sitting Sitting   Pulse: 90    Temp: 97.1 øF (36.2 øC)    SpO2: 97%    Weight: 66.7 kg (147 lb)    Height: 162.6 cm (64\")  Comment: patient reports       Body mass index is 25.23 kg/mý.    Physical Exam  Vitals reviewed.   Constitutional:       General: She is not in acute distress.     Appearance: Normal appearance. She is not ill-appearing.   Eyes:      Pupils: Pupils are equal, round, and reactive to light.   Neck:      Vascular: No carotid bruit.   Cardiovascular:      Rate and Rhythm: Normal rate and regular rhythm.      Heart sounds: Normal heart sounds.   Pulmonary:      Effort: Pulmonary effort is normal.      Breath sounds: Normal breath sounds.   Skin:     General: Skin is warm and dry.   Neurological:      General: No focal deficit present.      Mental Status: She is alert and oriented to person, place, and time. Mental status is at baseline.      Motor: No weakness.   Psychiatric:         Mood and Affect: Mood normal.         Behavior: Behavior normal.         Thought Content: Thought content normal.         Judgment: Judgment normal.       Imaging/Labs:  CT Head Without Contrast  Result Date: 8/27/2023  No findings to indicate acute large vessel occlusive disease. This study was performed using dose reduction techniques to achieve radiation exposure as low as reasonably achievable (ALARA).    CT Head Without Contrast  Result Date: 8/25/2023  No acute " intracranial process . Images reviewed, interpreted, and dictated by Dr. ELHAM Frazier. Transcribed by Addi Granado PA-C    CTA NECK / BRAIN STROKE PROTOCOL  Result Date: 8/27/2023  No evidence of acute large vessel occlusive disease. CTA CAROTID HISTORY: Neuro deficit, acute, stroke suspected, visual disturbance. Symptoms of CVA. COMPARISON: None TECHNIQUE: Thin section axial CT with IV contrast supplemented with multiplanar reconstruction under CT angiogram protocol. 3D reconstructions were performed on a separate workstation. NASCET criteria and technique was utilized during interpretation. FINDINGS: Aortic arch:  Arch shows no significant narrowing. Great vessel origins are widely patent. Right carotid:  No significant stenosis is seen of the cervical common or internal carotid artery. Mild plaque disease is noted. Left carotid:  High-grade stenosis of the proximal left ICA is seen up to 80%. No distal tandem lesion is seen. Vertebrals: Left vertebral artery is dominant.   No significant stenosis is present.   IMPRESSION:  High-grade left proximal ICA stenosis up to 80%.    This study was performed using dose reduction techniques to achieve radiation exposure as low as reasonably achievable (ALARA).    CT outside films (08/28/2023 15:53)     ..I personally reviewed this report and imaging.  Assessment / Plan      Assessment / Plan:  PMH significant for hypertension, hyperlipidemia, coronary artery disease s/p stent, paroxysmal atrial fibrillation, bilateral carotid stenosis s/p left CEA 2006 and right CEA with Dr. Chapin in 2016, and AAA s/p endograft repair on 9/29/2020 by Dr. Chapin.      1.  Bilateral carotid stenosis  S/p left CEA 2006 and right CEA 2016 by Dr. Chapin.  Evaluated via telehealth on 5/26/2023 at which time she denied symptoms of TIA or CVA along with acute neurologic dysfunction or vision changes, specifically amaurosis fugax.   Carotid US was stable with bilateral stenosis <50%.  Right  ICA PSV: 58; Left ICA PSV: 88   Presents today after recently being seen at [facility] emergency department twice for severe headache over the left eye.   States this is unrelieved by tylenol or aleve.  Small amount of relief with over the counter migraine medication.  Again denies other symptoms of TIA or CVA  or neurologic dysfunction.   Outside imaging as resulted above under imaging/labs.  CTA neck reports high grade stenosis of the proximal left ICA is seen up to 80%.     2.  Abdominal aortic aneurysm without rupture  S/p endograft repair by Dr. Chapin on 9/29/2020.  Last evaluated on 5/26/2023.  AA US stable.   Continue annual surveillance      Patient Education:  ..The BE FAST acronym helps you remember the signs and symptoms of a stroke: Balance loss, Eyesight loss, Facial drooping, Arm weakness, Speech difficulty, and Time to call 911    Follow Up:   Will review with Dr. Chapin.   Or sooner for any further concerns or worsening sign and symptoms.  Patient encouraged to call the office with any questions or concerns.     Thank you for allowing me to participate in your care.  Best Regards,    CAMI Srinivasan  [facility] Cardiothoracic Surgery  08/30/23  10:13 EDT    Addendum:  Refer to Neurosurgery, Dr. Mccullough, per Dr. Chapin.   ..Electronically signed by CAMI Conley, 08/31/23, 9:44 AM EDT.

## 2023-08-31 ENCOUNTER — TELEPHONE (OUTPATIENT)
Dept: CARDIAC SURGERY | Facility: CLINIC | Age: 88
End: 2023-08-31
Payer: MEDICARE

## 2023-08-31 NOTE — TELEPHONE ENCOUNTER
[facility] Cardiothoracic Surgery     Date of Encounter:  2023    Name: Latrice Everett  : 1934     Spoke with patient's daughter regarding referral to Dr. Mccullough per Dr. Chapin.  Verbalized understanding.     Thank you for allowing me to participate in your care.  Best Regards,    CAMI Srinivasan  [facility] Cardiothoracic Surgery  23  09:42 EDT

## 2024-04-09 DIAGNOSIS — I65.23 CAROTID STENOSIS, ASYMPTOMATIC, BILATERAL: Primary | ICD-10-CM

## 2024-04-09 DIAGNOSIS — I71.40 ABDOMINAL AORTIC ANEURYSM (AAA) WITHOUT RUPTURE, UNSPECIFIED PART: ICD-10-CM

## 2024-05-24 DIAGNOSIS — I71.40 ABDOMINAL AORTIC ANEURYSM (AAA) WITHOUT RUPTURE, UNSPECIFIED PART: ICD-10-CM

## 2024-06-06 ENCOUNTER — OFFICE VISIT (OUTPATIENT)
Dept: CARDIAC SURGERY | Facility: CLINIC | Age: 89
End: 2024-06-06
Payer: MEDICARE

## 2024-06-06 VITALS
WEIGHT: 151 LBS | SYSTOLIC BLOOD PRESSURE: 132 MMHG | DIASTOLIC BLOOD PRESSURE: 60 MMHG | TEMPERATURE: 98.1 F | HEART RATE: 89 BPM | BODY MASS INDEX: 25.78 KG/M2 | OXYGEN SATURATION: 98 % | HEIGHT: 64 IN

## 2024-06-06 DIAGNOSIS — I65.23 CAROTID STENOSIS, ASYMPTOMATIC, BILATERAL: Primary | ICD-10-CM

## 2024-06-06 DIAGNOSIS — Z98.890 STATUS POST ENDOVASCULAR ANEURYSM REPAIR (EVAR): ICD-10-CM

## 2024-06-06 DIAGNOSIS — Z86.79 STATUS POST ENDOVASCULAR ANEURYSM REPAIR (EVAR): ICD-10-CM

## 2024-06-06 DIAGNOSIS — I71.40 ABDOMINAL AORTIC ANEURYSM (AAA) WITHOUT RUPTURE, UNSPECIFIED PART: ICD-10-CM

## 2024-06-06 RX ORDER — AMIODARONE HYDROCHLORIDE 200 MG/1
200 TABLET ORAL
COMMUNITY
Start: 2024-05-14 | End: 2025-05-14

## 2024-06-06 RX ORDER — ASPIRIN 81 MG/1
81 TABLET ORAL DAILY
COMMUNITY

## 2024-06-06 NOTE — PROGRESS NOTES
[facility] Cardiothoracic Surgery Office Follow Up Note    Date of Encounter: 2024     Name: Latrice Everett  : 1934     Referred By: No ref. provider found  PCP: Luz Saul APRN    Chief Complaint:    Chief Complaint   Patient presents with    Follow-up     1 YR FU with Carotid Duplex and U/S AAA-Hx of EVAR  and Right CEA        Subjective      History of Present Illness:    It was nice to see Latrice Everett in follow up.  She is a pleasant 89 y.o. female with PMH significant for hypertension, hyperlipidemia, coronary artery disease s/p stent, paroxysmal atrial fibrillation, bilateral carotid stenosis s/p left CEA  and right CEA with Dr. Chapin in , and AAA s/p endograft repair on 2020 by Dr. Chapin.        Patient was last seen in office by myself on 2023.  She had presented to outside emergency department twice with complaint of severe headache.  Patient reported pain radiated from above her left eye straight back into her head.  She did report taking an over-the-counter migraine medication with caffeine which provided her a small amount of relief.  CTA neck reported high-grade stenosis of the proximal left ICA up to 80%.  Patient was to be referred to neurosurgery, Dr. Mccullough.  This unfortunately somehow fell through.  Ms. Everett presents today for annual surveillance with imaging, accompanied by her daughter.  Patient denies symptoms of TIA or CVA since last visit.  She denies acute neurologic dysfunction or vision changes, specifically amaurosis fugax.  Her previous headaches have improved.  In regards to her abdominal aortic aneurysm patient denies unusual abdomen, back, or flank pain.  Reports medication compliance.    Review of Systems:  Review of Systems   Constitutional: Negative for chills, decreased appetite, diaphoresis, fever, malaise/fatigue, night sweats, weight gain and weight loss.   HENT:  Positive for hearing loss. Negative for hoarse  voice.    Eyes:  Negative for blurred vision, double vision and visual disturbance.   Cardiovascular:  Positive for chest pain, dyspnea on exertion and leg swelling (mild). Negative for claudication, irregular heartbeat, near-syncope, orthopnea, palpitations, paroxysmal nocturnal dyspnea and syncope.   Respiratory:  Positive for shortness of breath. Negative for cough, hemoptysis, sputum production and wheezing.    Hematologic/Lymphatic: Negative for adenopathy and bleeding problem. Bruises/bleeds easily.   Skin:  Negative for color change, nail changes, poor wound healing and rash.   Musculoskeletal:  Positive for back pain and joint pain. Negative for falls and muscle cramps.   Gastrointestinal:  Positive for dysphagia. Negative for abdominal pain and heartburn.   Genitourinary:  Negative for flank pain.   Neurological:  Positive for light-headedness. Negative for brief paralysis, disturbances in coordination, dizziness, focal weakness, headaches, loss of balance, numbness, paresthesias, sensory change, vertigo and weakness.   Psychiatric/Behavioral:  Positive for depression and memory loss (after having low sodium). Negative for suicidal ideas. The patient is nervous/anxious.    Allergic/Immunologic: Negative for persistent infections.       I have reviewed the following portions of the patient's history: problem list, current medications, allergies, past surgical history, past medical history, past social history, past family history, and ROS and confirm it's accurate.    Allergies:  Allergies   Allergen Reactions    Morphine Nausea And Vomiting       Medications:      Current Outpatient Medications:     amiodarone (PACERONE) 200 MG tablet, Take 1 tablet by mouth., Disp: , Rfl:     aspirin 81 MG EC tablet, Take 1 tablet by mouth Daily., Disp: , Rfl:     GEMFIBROZIL PO, Take  by mouth., Disp: , Rfl:     isosorbide mononitrate (IMDUR) 30 MG 24 hr tablet, Take 1 tablet by mouth Daily., Disp: , Rfl:      levothyroxine (SYNTHROID, LEVOTHROID) 100 MCG tablet, Take 1 tablet by mouth Every Night., Disp: , Rfl:     rivaroxaban (XARELTO) 20 MG tablet, Take 15 mg by mouth Daily. Last dose 9-, Disp: , Rfl:     rosuvastatin (CRESTOR) 20 MG tablet, Take 1 tablet by mouth Every Night., Disp: , Rfl:     valsartan (DIOVAN) 160 MG tablet, Take 1 tablet by mouth Daily., Disp: , Rfl:     alendronate (FOSAMAX) 70 MG tablet, Take 1 tablet by mouth Every 7 (Seven) Days. Saturdays (Patient not taking: Reported on 6/6/2024), Disp: , Rfl:     amLODIPine (NORVASC) 10 MG tablet, Take 5 mg by mouth Daily. (Patient not taking: Reported on 8/30/2023), Disp: , Rfl:     nitroglycerin (NITROSTAT) 0.4 MG SL tablet, Place 1 tablet under the tongue Every 5 (Five) Minutes As Needed for Chest Pain. Take no more than 3 doses in 15 minutes., Disp: , Rfl:     History:   Past Medical History:   Diagnosis Date    AAA (abdominal aortic aneurysm)     Arthritis     Atrial fibrillation     Carotid artery stenosis     Coronary artery disease     stent with Dr Vinay gallagher unknown    Dizzy     Dyslipidemia     GERD (gastroesophageal reflux disease)     [place] (hard of hearing)     hearing aids    Hypertension     Hypothyroidism     Irregular heart beat     Low sodium levels     Osteoporosis     Skin cancer     removed from face and hands     Wears dentures     upper    Wears glasses        Past Surgical History:   Procedure Laterality Date    ABDOMINAL AORTIC ANEURYSM REPAIR WITH ENDOGRAFT N/A 09/29/2020    Procedure: ABDOMINAL AORTIC ANEURYSM REPAIR WITH ENDOGRAFT;  Surgeon: Darwin Chapin MD;  Location:  [facility] OR 15;  Service: Cardiothoracic;  Laterality: N/A;  flouro 11 min 48 sec  dose 442 mGy  contrast 88 ml     CAROTID ENDARTERECTOMY Right 08/09/2016    Procedure: RIGHT CAROTID ENDARTERECTOMY;  Surgeon: Darwin Chapin MD;  Location:  [facility] OR;  Service:     CAROTID ENDARTERECTOMY Left 04/21/2006    History of Neurological Surgery     "CATARACT EXTRACTION Bilateral     COLONOSCOPY      x4    CORONARY STENT PLACEMENT      SKIN LESION EXCISION  07/06/2016    pre-cancerous lesion removed from face       Social History     Socioeconomic History    Marital status:     Number of children: 4   Tobacco Use    Smoking status: Never    Smokeless tobacco: Never   Vaping Use    Vaping status: Never Used   Substance and Sexual Activity    Alcohol use: No    Drug use: No    Sexual activity: Defer        Family History   Problem Relation Age of Onset    Peripheral vascular disease Mother     Heart failure Father        Objective     Physical Exam:  Vitals:    06/06/24 1327 06/06/24 1337   BP: 128/70 132/60   BP Location: Left arm    Patient Position: Sitting    Pulse: 89    Temp: 98.1 °F (36.7 °C)    SpO2: 98%    Weight: 68.5 kg (151 lb)    Height: 162.6 cm (64\")  Comment: patient reported       Body mass index is 25.92 kg/m².    Physical Exam  Vitals reviewed.   Constitutional:       General: She is not in acute distress.     Appearance: Normal appearance. She is not ill-appearing.   Eyes:      Pupils: Pupils are equal, round, and reactive to light.   Neck:      Vascular: No carotid bruit.   Cardiovascular:      Rate and Rhythm: Normal rate and regular rhythm.      Heart sounds: No murmur heard.     No friction rub.   Pulmonary:      Effort: Pulmonary effort is normal.      Breath sounds: Normal breath sounds.   Neurological:      Mental Status: She is alert.   Psychiatric:         Mood and Affect: Mood normal.         Speech: Speech normal.         Behavior: Behavior normal. Behavior is cooperative.         Thought Content: Thought content normal.         Cognition and Memory: Memory is impaired.         Imaging/Labs:  US CAROTID BLOOD FLOW BILATERAL (05/24/2024 08:26)   US Carotid Bilateral (05/24/2024)   Result Date: 5/24/2024  Less than 50% stenosis    US screening Abdominal Aortic Aneurysm (AAA) (05/24/2024 08:26)   US aorta limited (05/24/2024) "   Result Date: 5/24/2024  No evidence of abdominal aortic aneurysm. Images reviewed, interpreted, and dictated by Bernarda Ackerman MD        Assessment / Plan    Assessment / Plan:  1.  Bilateral carotid stenosis  S/p left CEA 2006 and right CEA 2016 by Dr. Chapin.  Seen in office by myself on 8/30/2023.  Had presented to outside emergency department twice with complaint of severe headache.  Reported pain radiated from above her left eye straight back into her head.  Reported taking and OTC migraine medication with caffeine which provided her a small amount of relief.  CTA neck reported high-grade stenosis of the proximal left ICA, up to 80%.  Patient was to be referred to neurosurgery, Dr. Mccullough.  This unfortunately somehow fell through.  Presents today for annual surveillance with imaging.  Denies symptoms of TIA or CVA since last visit.  Denies acute neurologic dysfunction or vision changes, specifically amaurosis fugax.  Previous headaches have improved.  Carotid duplex with less than 50% stenosis bilaterally.       2.  Abdominal aortic aneurysm without rupture  S/p endograft repair by Dr. Chapin on 9/29/2020.  Last evaluated on 5/26/2023.  AA US stable.   Denies unusual abdomen, back, or flank pain.  Ultrasound with no evidence of abdominal aortic aneurysm.  Continue annual surveillance.    Follow Up:   We will plan for follow-up in 1 year with imaging.  Patient encouraged to call the office with any questions or concerns.     Thank you for allowing me to participate in your care.  Best Regards,    CAMI Srinivasan  [facility] Cardiothoracic Surgery  06/06/24  13:37 EDT    I spent 21 minutes caring for Ms. Everett on this date of service. This time includes time spent by me in the following activities: preparing for the visit, reviewing tests, obtaining and/or reviewing a separately obtained history, performing a medically appropriate examination and/or evaluation, counseling and educating the  patient/family/caregiver, ordering medications, tests, or procedures, referring and communicating with other health care professionals, documenting information in the medical record, independently interpreting results and communicating that information with the patient/family/caregiver, and care coordination.

## 2025-04-29 DIAGNOSIS — I71.40 ABDOMINAL AORTIC ANEURYSM (AAA) WITHOUT RUPTURE, UNSPECIFIED PART: ICD-10-CM

## 2025-04-29 DIAGNOSIS — I65.23 CAROTID STENOSIS, ASYMPTOMATIC, BILATERAL: Primary | ICD-10-CM

## 2025-06-10 ENCOUNTER — TELEPHONE (OUTPATIENT)
Dept: CARDIAC SURGERY | Facility: CLINIC | Age: OVER 89
End: 2025-06-10

## 2025-06-10 NOTE — TELEPHONE ENCOUNTER
Caller: Felisa Fall    Relationship: Emergency Contact    Best call back number: [phone]     What is the best time to reach you: ANYTIME    Who are you requesting to speak with (clinical staff, provider,  specific staff member): CLINICAL    Do you know the name of the person who called: PTS DAUGHTER     What was the call regarding: PT IS NOT ABLE TO MAKE THE TRIP FOR THE F/U. SHE DID COMPLETE TESTING AND HER DAUGHTER WANTED TO ENSURE SOMEONE CLINICAL WOULD CALL IF THERE WAS A PROBLEM AND SHE ABSOLUTELY NEEDED TO BRING HER IN. SHE SAID SHE WOULD FIND A WAY IF NECESSARY BUT HER MOTHER GETS VERY TIRED. PLEASE CONTACT TO ADVISE ON THE RESULTS FOR THE PTS TESTING THANKS

## 2025-06-13 NOTE — TELEPHONE ENCOUNTER
Caller: Felisa Fall    Relationship: Emergency Contact    Best call back number: [phone]    What is the best time to reach you: any    Who are you requesting to speak with (clinical staff, provider,  specific staff member): clinical    Do you know the name of the person who called: unknown    What was the call regarding: daughter returning phone call from office about follow up appointment     Is it okay if the provider responds through MyChart: no

## 2025-06-16 ENCOUNTER — TELEPHONE (OUTPATIENT)
Dept: CARDIAC SURGERY | Facility: CLINIC | Age: OVER 89
End: 2025-06-16
Payer: MEDICARE

## 2025-06-16 NOTE — TELEPHONE ENCOUNTER
I called and spoke with Felisa Fall pt's daughter about sending the results of the carotid and U/S AAA to pt's PCP so that she may know the outcome but not have to travel to [facility]/ it is so hard on this pt to do so. Per Junie Robbins nothing interventional could be done and Pt needs to follow with PCP for the results and any future surveillance can be done by PCP as well. Pt's new PCP is Dr. Vazquez of [facility]  most recent U/S AAA and carotid duplex as well as the yr prio along with both op notes and last office visit faxed to Dr. Vazquez's office. DLO

## (undated) DEVICE — Device

## (undated) DEVICE — SPNG GZ WOVN 4X4IN 12PLY 10/BX STRL

## (undated) DEVICE — SKIN AFFIX SURG ADHESIVE 72/CS 0.55ML: Brand: MEDLINE

## (undated) DEVICE — RADIFOCUS GLIDEWIRE ADVANTAGE GUIDEWIRE: Brand: GLIDEWIRE ADVANTAGE

## (undated) DEVICE — 3M™ IOBAN™ 2 ANTIMICROBIAL INCISE DRAPE 6651EZ: Brand: IOBAN™ 2

## (undated) DEVICE — LUER-LOK 360°: Brand: CONNECTA, LUER-LOK

## (undated) DEVICE — BIOPSY SHIELD-ORANGE: Brand: RADPAD

## (undated) DEVICE — PERCLOSE PROGLIDE™ SUTURE-MEDIATED CLOSURE SYSTEM: Brand: PERCLOSE PROGLIDE™

## (undated) DEVICE — BOWL UTIL STRL 32OZ

## (undated) DEVICE — ANTIBACTERIAL UNDYED BRAIDED (POLYGLACTIN 910), SYNTHETIC ABSORBABLE SUTURE: Brand: COATED VICRYL

## (undated) DEVICE — DECANT BG O JET

## (undated) DEVICE — SNAP KOVER: Brand: UNBRANDED

## (undated) DEVICE — SUT PROLN 6/0 C1 D/A 30IN 8706H

## (undated) DEVICE — NDL PERC 1PRT THNWALL W/BASEPLT 18G 7CM

## (undated) DEVICE — GW AMPLTZ SUPERSTIFF STR .035IN 180CM

## (undated) DEVICE — TP MICROFM 3IN LF

## (undated) DEVICE — SYR LUERLOK 5CC

## (undated) DEVICE — CLTH CLENS READYCLEANSE PERI CARE PK/5

## (undated) DEVICE — TBG INJ CONTRL EXCITE RA 1200PSI 48IN

## (undated) DEVICE — SUT SILK 3/0 TIES 18IN A184H

## (undated) DEVICE — SYR LUERLOK 30CC

## (undated) DEVICE — SUCTION CANISTER, 2500CC, RIGID: Brand: DEROYAL

## (undated) DEVICE — SAFESECURE,SECUREMENT,FOLEY CATH,STERILE: Brand: MEDLINE

## (undated) DEVICE — PK VASC 10

## (undated) DEVICE — GLIDEX™ COATED HYDROPHILIC GUIDEWIRE: Brand: MAGIC TORQUE™

## (undated) DEVICE — CVR HNDL LIGHT RIGID

## (undated) DEVICE — SI AVANTI+ 7F STD W/GW  NO OBT: Brand: AVANTI

## (undated) DEVICE — BALN OCCL MOLDING .035 10TO37MM 4X90CM

## (undated) DEVICE — GLV SURG PREMIERPRO MIC LTX PF SZ7 BRN

## (undated) DEVICE — SYR LUERLOK 50ML

## (undated) DEVICE — SUT SILK 2/0 TIES 18IN A185H